# Patient Record
Sex: MALE | Race: OTHER | HISPANIC OR LATINO | URBAN - METROPOLITAN AREA
[De-identification: names, ages, dates, MRNs, and addresses within clinical notes are randomized per-mention and may not be internally consistent; named-entity substitution may affect disease eponyms.]

---

## 2018-03-11 ENCOUNTER — INPATIENT (INPATIENT)
Facility: HOSPITAL | Age: 48
LOS: 2 days | Discharge: ANOTHER HOSPITAL | DRG: 603 | End: 2018-03-14
Attending: SURGERY | Admitting: SURGERY
Payer: COMMERCIAL

## 2018-03-11 VITALS — HEIGHT: 67 IN | WEIGHT: 250 LBS

## 2018-03-11 LAB
ALBUMIN SERPL ELPH-MCNC: 4.7 G/DL — SIGNIFICANT CHANGE UP (ref 3.3–5.2)
ALP SERPL-CCNC: 148 U/L — HIGH (ref 40–120)
ALT FLD-CCNC: 27 U/L — SIGNIFICANT CHANGE UP
ANION GAP SERPL CALC-SCNC: 14 MMOL/L — SIGNIFICANT CHANGE UP (ref 5–17)
APPEARANCE UR: ABNORMAL
APTT BLD: 30.8 SEC — SIGNIFICANT CHANGE UP (ref 27.5–37.4)
AST SERPL-CCNC: 22 U/L — SIGNIFICANT CHANGE UP
BASOPHILS # BLD AUTO: 0 K/UL — SIGNIFICANT CHANGE UP (ref 0–0.2)
BASOPHILS NFR BLD AUTO: 0.3 % — SIGNIFICANT CHANGE UP (ref 0–2)
BILIRUB SERPL-MCNC: 0.5 MG/DL — SIGNIFICANT CHANGE UP (ref 0.4–2)
BILIRUB UR-MCNC: NEGATIVE — SIGNIFICANT CHANGE UP
BUN SERPL-MCNC: 6 MG/DL — LOW (ref 8–20)
CALCIUM SERPL-MCNC: 9.8 MG/DL — SIGNIFICANT CHANGE UP (ref 8.6–10.2)
CHLORIDE SERPL-SCNC: 96 MMOL/L — LOW (ref 98–107)
CO2 SERPL-SCNC: 27 MMOL/L — SIGNIFICANT CHANGE UP (ref 22–29)
COLOR SPEC: YELLOW — SIGNIFICANT CHANGE UP
CREAT SERPL-MCNC: 0.68 MG/DL — SIGNIFICANT CHANGE UP (ref 0.5–1.3)
DIFF PNL FLD: NEGATIVE — SIGNIFICANT CHANGE UP
EOSINOPHIL # BLD AUTO: 0.3 K/UL — SIGNIFICANT CHANGE UP (ref 0–0.5)
EOSINOPHIL NFR BLD AUTO: 2.3 % — SIGNIFICANT CHANGE UP (ref 0–5)
EPI CELLS # UR: SIGNIFICANT CHANGE UP
GLUCOSE SERPL-MCNC: 153 MG/DL — HIGH (ref 70–115)
GLUCOSE UR QL: NEGATIVE MG/DL — SIGNIFICANT CHANGE UP
HCT VFR BLD CALC: 43 % — SIGNIFICANT CHANGE UP (ref 42–52)
HGB BLD-MCNC: 14.8 G/DL — SIGNIFICANT CHANGE UP (ref 14–18)
INR BLD: 1.09 RATIO — SIGNIFICANT CHANGE UP (ref 0.88–1.16)
KETONES UR-MCNC: NEGATIVE — SIGNIFICANT CHANGE UP
LACTATE BLDV-MCNC: 1.3 MMOL/L — SIGNIFICANT CHANGE UP (ref 0.5–2)
LEUKOCYTE ESTERASE UR-ACNC: NEGATIVE — SIGNIFICANT CHANGE UP
LYMPHOCYTES # BLD AUTO: 1.5 K/UL — SIGNIFICANT CHANGE UP (ref 1–4.8)
LYMPHOCYTES # BLD AUTO: 10.7 % — LOW (ref 20–55)
MCHC RBC-ENTMCNC: 27.2 PG — SIGNIFICANT CHANGE UP (ref 27–31)
MCHC RBC-ENTMCNC: 34.4 G/DL — SIGNIFICANT CHANGE UP (ref 32–36)
MCV RBC AUTO: 79 FL — LOW (ref 80–94)
MONOCYTES # BLD AUTO: 1.1 K/UL — HIGH (ref 0–0.8)
MONOCYTES NFR BLD AUTO: 7.9 % — SIGNIFICANT CHANGE UP (ref 3–10)
NEUTROPHILS # BLD AUTO: 10.7 K/UL — HIGH (ref 1.8–8)
NEUTROPHILS NFR BLD AUTO: 77.4 % — HIGH (ref 37–73)
NITRITE UR-MCNC: NEGATIVE — SIGNIFICANT CHANGE UP
PH UR: 7 — SIGNIFICANT CHANGE UP (ref 5–8)
PLATELET # BLD AUTO: 127 K/UL — LOW (ref 150–400)
POTASSIUM SERPL-MCNC: 3.7 MMOL/L — SIGNIFICANT CHANGE UP (ref 3.5–5.3)
POTASSIUM SERPL-SCNC: 3.7 MMOL/L — SIGNIFICANT CHANGE UP (ref 3.5–5.3)
PROT SERPL-MCNC: 7.8 G/DL — SIGNIFICANT CHANGE UP (ref 6.6–8.7)
PROT UR-MCNC: NEGATIVE MG/DL — SIGNIFICANT CHANGE UP
PROTHROM AB SERPL-ACNC: 12 SEC — SIGNIFICANT CHANGE UP (ref 9.8–12.7)
RBC # BLD: 5.44 M/UL — SIGNIFICANT CHANGE UP (ref 4.6–6.2)
RBC # FLD: 14 % — SIGNIFICANT CHANGE UP (ref 11–15.6)
RBC CASTS # UR COMP ASSIST: NEGATIVE /HPF — SIGNIFICANT CHANGE UP (ref 0–4)
SODIUM SERPL-SCNC: 137 MMOL/L — SIGNIFICANT CHANGE UP (ref 135–145)
SP GR SPEC: 1 — LOW (ref 1.01–1.02)
UROBILINOGEN FLD QL: NEGATIVE MG/DL — SIGNIFICANT CHANGE UP
WBC # BLD: 13.8 K/UL — HIGH (ref 4.8–10.8)
WBC # FLD AUTO: 13.8 K/UL — HIGH (ref 4.8–10.8)
WBC UR QL: NEGATIVE — SIGNIFICANT CHANGE UP

## 2018-03-11 PROCEDURE — 71045 X-RAY EXAM CHEST 1 VIEW: CPT | Mod: 26

## 2018-03-11 PROCEDURE — 93010 ELECTROCARDIOGRAM REPORT: CPT

## 2018-03-11 PROCEDURE — 99285 EMERGENCY DEPT VISIT HI MDM: CPT

## 2018-03-11 PROCEDURE — 70491 CT SOFT TISSUE NECK W/DYE: CPT | Mod: 26

## 2018-03-11 RX ORDER — AMPICILLIN SODIUM AND SULBACTAM SODIUM 250; 125 MG/ML; MG/ML
3 INJECTION, POWDER, FOR SUSPENSION INTRAMUSCULAR; INTRAVENOUS ONCE
Qty: 0 | Refills: 0 | Status: COMPLETED | OUTPATIENT
Start: 2018-03-11 | End: 2018-03-11

## 2018-03-11 RX ORDER — SODIUM CHLORIDE 9 MG/ML
3000 INJECTION INTRAMUSCULAR; INTRAVENOUS; SUBCUTANEOUS ONCE
Qty: 0 | Refills: 0 | Status: COMPLETED | OUTPATIENT
Start: 2018-03-11 | End: 2018-03-11

## 2018-03-11 RX ORDER — DEXAMETHASONE 0.5 MG/5ML
10 ELIXIR ORAL ONCE
Qty: 0 | Refills: 0 | Status: COMPLETED | OUTPATIENT
Start: 2018-03-11 | End: 2018-03-11

## 2018-03-11 RX ORDER — ACETAMINOPHEN 500 MG
1000 TABLET ORAL ONCE
Qty: 0 | Refills: 0 | Status: COMPLETED | OUTPATIENT
Start: 2018-03-11 | End: 2018-03-11

## 2018-03-11 RX ADMIN — Medication 400 MILLIGRAM(S): at 19:23

## 2018-03-11 RX ADMIN — AMPICILLIN SODIUM AND SULBACTAM SODIUM 200 GRAM(S): 250; 125 INJECTION, POWDER, FOR SUSPENSION INTRAMUSCULAR; INTRAVENOUS at 19:23

## 2018-03-11 RX ADMIN — Medication 102 MILLIGRAM(S): at 19:24

## 2018-03-11 RX ADMIN — SODIUM CHLORIDE 2000 MILLILITER(S): 9 INJECTION INTRAMUSCULAR; INTRAVENOUS; SUBCUTANEOUS at 19:19

## 2018-03-11 NOTE — ED ADULT TRIAGE NOTE - CHIEF COMPLAINT QUOTE
pt  c/o "tooth ache" and graciela with swallowing, pt reports fevers and is diabetic,pt pale and weak

## 2018-03-11 NOTE — ED ADULT NURSE REASSESSMENT NOTE - NS ED NURSE REASSESS COMMENT FT1
Pt urinated 1000ml clear, yellow urine without difficulty.  No visible blood.  Respirations even and unlabored.  Oxygen saturation remains above 95% on room air.

## 2018-03-11 NOTE — ED PROVIDER NOTE - CHPI ED SYMPTOMS POS
FEVER/+FACIAL SWELLING, TOOTHACHE, DIFFICULTY TOLERATING SECRETIONS, SORE THROAT/DIFFICULTY BREATHING

## 2018-03-11 NOTE — ED PROVIDER NOTE - OBJECTIVE STATEMENT
48 y/o M, with hx of DM and tobacco use, presents to the ED c/o toothache, right sided facial swelling, sore throat, and fever, onset 2 days ago.  Associated sx includes difficulty tolerating secretions and difficulty breathing.  Pt states that he was seen by his PCP yesterday, for a recent kidney stone and hematuria, onset 2 weeks ago.  Pt was not prescribed abx.  Denies CP, HA, N/V/D, abd pain, back pain, cough, visual changes, or chills.  PCP: Dr. Mariana Cardozo.  Code sepsis activated.

## 2018-03-11 NOTE — ED ADULT NURSE NOTE - ED STAT RN HANDOFF DETAILS
pt a+ox4, in no apparent distress or discomfort lying comfortably in bed. pt admitted to SICU, bed available/. pt placed on supplemental 02 via NC, VSS and in no apparent distress. pt transported to SICU stable, bedside report given to Socorro LANDIN. pt accdepted.

## 2018-03-11 NOTE — ED PROVIDER NOTE - PROGRESS NOTE DETAILS
Labs and CT results as noted.  Pt seen and eval by Surgical Critical Care for potential airway concerns and will admit

## 2018-03-11 NOTE — ED STATDOCS - PROGRESS NOTE DETAILS
48 y/o M pt with hx of DM presents to ED c/o mouth pain, difficulty swallowing (Channing's angina) since yesterday. Pt has not been able to eat anything since yesterday. NKDA.   Channing's angina, possible impending airway instruction, will move to critical care area

## 2018-03-11 NOTE — ED ADULT NURSE NOTE - OBJECTIVE STATEMENT
assumed patient care at time of code sepsis.  Pt c/o right sided tooth pain X 2 days.  Swelling to right side of neck.  Difficulty breathing and difficulty swallowing.  Able to swallow secretions at time of assessment.  Saw PMD yesterday and was given only pain medication.  Pt does not know the name. Pt also c/o hematuria X 15 days.

## 2018-03-11 NOTE — ED ADULT NURSE REASSESSMENT NOTE - NS ED NURSE REASSESS COMMENT FT1
Pt sleeping with even, unlabored respirations.  Oxygen saturation 93-95% on room air.  Awaiting transport to CT. Pt sleeping with even, unlabored respirations.  Oxygen saturation 93-95% on room air.

## 2018-03-11 NOTE — ED PROVIDER NOTE - CONSTITUTIONAL, MLM
normal... Appears uncomfortable, well nourished, awake, alert, oriented to person, place, time/situation and in no acute respiratory distress

## 2018-03-11 NOTE — ED PROVIDER NOTE - ENMT, MLM
Moist mucous membranes, airway intact, oropharynx difficult to visual secondary to pain and cannot open his mouth. unable to visualize posterior oropharynx. Right sided facial swelling around mandible, no erythema, not drooling

## 2018-03-12 DIAGNOSIS — R22.1 LOCALIZED SWELLING, MASS AND LUMP, NECK: ICD-10-CM

## 2018-03-12 DIAGNOSIS — M27.2 INFLAMMATORY CONDITIONS OF JAWS: ICD-10-CM

## 2018-03-12 DIAGNOSIS — R22.0 LOCALIZED SWELLING, MASS AND LUMP, HEAD: ICD-10-CM

## 2018-03-12 DIAGNOSIS — E11.9 TYPE 2 DIABETES MELLITUS WITHOUT COMPLICATIONS: ICD-10-CM

## 2018-03-12 DIAGNOSIS — J98.9 RESPIRATORY DISORDER, UNSPECIFIED: ICD-10-CM

## 2018-03-12 LAB
ANION GAP SERPL CALC-SCNC: 15 MMOL/L — SIGNIFICANT CHANGE UP (ref 5–17)
BASOPHILS # BLD AUTO: 0 K/UL — SIGNIFICANT CHANGE UP (ref 0–0.2)
BASOPHILS NFR BLD AUTO: 0.1 % — SIGNIFICANT CHANGE UP (ref 0–2)
BLD GP AB SCN SERPL QL: SIGNIFICANT CHANGE UP
BUN SERPL-MCNC: 12 MG/DL — SIGNIFICANT CHANGE UP (ref 8–20)
CALCIUM SERPL-MCNC: 9.4 MG/DL — SIGNIFICANT CHANGE UP (ref 8.6–10.2)
CHLORIDE SERPL-SCNC: 102 MMOL/L — SIGNIFICANT CHANGE UP (ref 98–107)
CO2 SERPL-SCNC: 23 MMOL/L — SIGNIFICANT CHANGE UP (ref 22–29)
CREAT SERPL-MCNC: 0.57 MG/DL — SIGNIFICANT CHANGE UP (ref 0.5–1.3)
CULTURE RESULTS: NO GROWTH — SIGNIFICANT CHANGE UP
EOSINOPHIL # BLD AUTO: 0 K/UL — SIGNIFICANT CHANGE UP (ref 0–0.5)
EOSINOPHIL NFR BLD AUTO: 0.1 % — SIGNIFICANT CHANGE UP (ref 0–5)
GLUCOSE BLDC GLUCOMTR-MCNC: 158 MG/DL — HIGH (ref 70–99)
GLUCOSE BLDC GLUCOMTR-MCNC: 178 MG/DL — HIGH (ref 70–99)
GLUCOSE BLDC GLUCOMTR-MCNC: 204 MG/DL — HIGH (ref 70–99)
GLUCOSE SERPL-MCNC: 131 MG/DL — HIGH (ref 70–115)
HBA1C BLD-MCNC: 6.5 % — HIGH (ref 4–5.6)
HCT VFR BLD CALC: 39.9 % — LOW (ref 42–52)
HGB BLD-MCNC: 13.6 G/DL — LOW (ref 14–18)
LYMPHOCYTES # BLD AUTO: 1.3 K/UL — SIGNIFICANT CHANGE UP (ref 1–4.8)
LYMPHOCYTES # BLD AUTO: 10.9 % — LOW (ref 20–55)
MCHC RBC-ENTMCNC: 27 PG — SIGNIFICANT CHANGE UP (ref 27–31)
MCHC RBC-ENTMCNC: 34.1 G/DL — SIGNIFICANT CHANGE UP (ref 32–36)
MCV RBC AUTO: 79.2 FL — LOW (ref 80–94)
MONOCYTES # BLD AUTO: 0.9 K/UL — HIGH (ref 0–0.8)
MONOCYTES NFR BLD AUTO: 7.6 % — SIGNIFICANT CHANGE UP (ref 3–10)
NEUTROPHILS # BLD AUTO: 9.9 K/UL — HIGH (ref 1.8–8)
NEUTROPHILS NFR BLD AUTO: 81.1 % — HIGH (ref 37–73)
PLATELET # BLD AUTO: 137 K/UL — LOW (ref 150–400)
POTASSIUM SERPL-MCNC: 3.6 MMOL/L — SIGNIFICANT CHANGE UP (ref 3.5–5.3)
POTASSIUM SERPL-SCNC: 3.6 MMOL/L — SIGNIFICANT CHANGE UP (ref 3.5–5.3)
RBC # BLD: 5.04 M/UL — SIGNIFICANT CHANGE UP (ref 4.6–6.2)
RBC # FLD: 14 % — SIGNIFICANT CHANGE UP (ref 11–15.6)
SODIUM SERPL-SCNC: 140 MMOL/L — SIGNIFICANT CHANGE UP (ref 135–145)
SPECIMEN SOURCE: SIGNIFICANT CHANGE UP
TYPE + AB SCN PNL BLD: SIGNIFICANT CHANGE UP
WBC # BLD: 12.2 K/UL — HIGH (ref 4.8–10.8)
WBC # FLD AUTO: 12.2 K/UL — HIGH (ref 4.8–10.8)

## 2018-03-12 RX ORDER — AMPICILLIN SODIUM AND SULBACTAM SODIUM 250; 125 MG/ML; MG/ML
3 INJECTION, POWDER, FOR SUSPENSION INTRAMUSCULAR; INTRAVENOUS EVERY 6 HOURS
Qty: 0 | Refills: 0 | Status: DISCONTINUED | OUTPATIENT
Start: 2018-03-12 | End: 2018-03-13

## 2018-03-12 RX ORDER — ENOXAPARIN SODIUM 100 MG/ML
40 INJECTION SUBCUTANEOUS DAILY
Qty: 0 | Refills: 0 | Status: DISCONTINUED | OUTPATIENT
Start: 2018-03-12 | End: 2018-03-14

## 2018-03-12 RX ORDER — INSULIN LISPRO 100/ML
VIAL (ML) SUBCUTANEOUS EVERY 6 HOURS
Qty: 0 | Refills: 0 | Status: DISCONTINUED | OUTPATIENT
Start: 2018-03-12 | End: 2018-03-14

## 2018-03-12 RX ORDER — DEXAMETHASONE 0.5 MG/5ML
4 ELIXIR ORAL EVERY 6 HOURS
Qty: 0 | Refills: 0 | Status: DISCONTINUED | OUTPATIENT
Start: 2018-03-12 | End: 2018-03-12

## 2018-03-12 RX ORDER — KETOROLAC TROMETHAMINE 30 MG/ML
15 SYRINGE (ML) INJECTION EVERY 6 HOURS
Qty: 0 | Refills: 0 | Status: DISCONTINUED | OUTPATIENT
Start: 2018-03-12 | End: 2018-03-13

## 2018-03-12 RX ORDER — PANTOPRAZOLE SODIUM 20 MG/1
40 TABLET, DELAYED RELEASE ORAL DAILY
Qty: 0 | Refills: 0 | Status: DISCONTINUED | OUTPATIENT
Start: 2018-03-12 | End: 2018-03-13

## 2018-03-12 RX ORDER — INFLUENZA VIRUS VACCINE 15; 15; 15; 15 UG/.5ML; UG/.5ML; UG/.5ML; UG/.5ML
0.5 SUSPENSION INTRAMUSCULAR ONCE
Qty: 0 | Refills: 0 | Status: DISCONTINUED | OUTPATIENT
Start: 2018-03-12 | End: 2018-03-14

## 2018-03-12 RX ORDER — SODIUM CHLORIDE 9 MG/ML
1000 INJECTION, SOLUTION INTRAVENOUS
Qty: 0 | Refills: 0 | Status: DISCONTINUED | OUTPATIENT
Start: 2018-03-12 | End: 2018-03-13

## 2018-03-12 RX ADMIN — AMPICILLIN SODIUM AND SULBACTAM SODIUM 200 GRAM(S): 250; 125 INJECTION, POWDER, FOR SUSPENSION INTRAMUSCULAR; INTRAVENOUS at 18:00

## 2018-03-12 RX ADMIN — SODIUM CHLORIDE 125 MILLILITER(S): 9 INJECTION, SOLUTION INTRAVENOUS at 11:31

## 2018-03-12 RX ADMIN — AMPICILLIN SODIUM AND SULBACTAM SODIUM 200 GRAM(S): 250; 125 INJECTION, POWDER, FOR SUSPENSION INTRAMUSCULAR; INTRAVENOUS at 07:14

## 2018-03-12 RX ADMIN — Medication 15 MILLIGRAM(S): at 12:30

## 2018-03-12 RX ADMIN — Medication 15 MILLIGRAM(S): at 18:00

## 2018-03-12 RX ADMIN — Medication 4: at 07:23

## 2018-03-12 RX ADMIN — ENOXAPARIN SODIUM 40 MILLIGRAM(S): 100 INJECTION SUBCUTANEOUS at 11:26

## 2018-03-12 RX ADMIN — SODIUM CHLORIDE 125 MILLILITER(S): 9 INJECTION, SOLUTION INTRAVENOUS at 06:14

## 2018-03-12 RX ADMIN — AMPICILLIN SODIUM AND SULBACTAM SODIUM 200 GRAM(S): 250; 125 INJECTION, POWDER, FOR SUSPENSION INTRAMUSCULAR; INTRAVENOUS at 11:25

## 2018-03-12 RX ADMIN — Medication 2: at 11:26

## 2018-03-12 RX ADMIN — SODIUM CHLORIDE 50 MILLILITER(S): 9 INJECTION, SOLUTION INTRAVENOUS at 18:00

## 2018-03-12 RX ADMIN — PANTOPRAZOLE SODIUM 40 MILLIGRAM(S): 20 TABLET, DELAYED RELEASE ORAL at 12:02

## 2018-03-12 RX ADMIN — Medication 15 MILLIGRAM(S): at 11:58

## 2018-03-12 RX ADMIN — Medication 2: at 18:00

## 2018-03-12 NOTE — H&P ADULT - PROBLEM SELECTOR PLAN 1
ENT consult  Abx  Airway precautions  NPO  +/- IV steroids  ISS    ?transfer for definitive OMFS/dental f/u

## 2018-03-12 NOTE — H&P ADULT - NSHPPHYSICALEXAM_GEN_ALL_CORE
Normal appearing adult M lying in bed in NAD  + marked R sided neck/lower facial swelling below jaw line, TTP, indurated, no fluctuance  + trismus, only able to open mouth a few cm. Uvula midline, posterior oropharynx visualized, no visible tonsillar swelling/enlargment. Poor dentition with several missing molars R mandibular surface  S1S2+ RRR no M/R/G  CTA B/L good BS B/L no W/R/R  Abd soft NTND  No legs swelling/edema noted  Skin warm, moist  Awake and alert, no deficits

## 2018-03-12 NOTE — H&P ADULT - ASSESSMENT
A:    47M  HD #1    Here for:    1. Cellulitis, neck/facial  ---> Suspect odontogenic in origin, given locations (suspect mandibular molar as source). However, possible may be tonsillar  2. Oropharyngeal swelling  ---> Concern for impending airway compromise/high risk airway

## 2018-03-12 NOTE — H&P ADULT - ATTENDING COMMENTS
47M with palantine tonsillar edema/inflammation, right mandibular swelling likely from odontoid origin.  Endorses odynophagia and dysphagia, dystonia. No stridor. No difficulty handling secretions currently.     Admit  Abx  Monitor closely - if worsening dysphagia will plan for fiberoptic intubation with preparation for emergent surgical airway if needed  ENT consultation

## 2018-03-12 NOTE — H&P ADULT - NSHPLABSRESULTS_GEN_ALL_CORE
WBC 13.8  Glucose 153  LA 1.3    Labs otherwise unremarkable    CT Neck w/ IV contrast: R sided swelling, no abscess, airway narrowing.

## 2018-03-12 NOTE — PROGRESS NOTE ADULT - SUBJECTIVE AND OBJECTIVE BOX
INTERVAL HPI/OVERNIGHT EVENTS/SUBJECTIVE:  Says he feels better.  Denies difficulty breathing    ICU Vital Signs Last 24 Hrs  T(C): 36.7 (12 Mar 2018 07:50), Max: 39.1 (11 Mar 2018 17:57)  T(F): 98.1 (12 Mar 2018 07:50), Max: 102.4 (11 Mar 2018 17:57)  HR: 81 (12 Mar 2018 11:00) (75 - 110)  BP: 117/77 (12 Mar 2018 11:00) (117/77 - 158/88)  BP(mean): 94 (12 Mar 2018 07:00) (88 - 107)  ABP: --  ABP(mean): --  RR: 20 (12 Mar 2018 11:00) (16 - 28)  SpO2: 97% (12 Mar 2018 11:00) (92% - 99%)      I&O's Detail    11 Mar 2018 07:01  -  12 Mar 2018 07:00  --------------------------------------------------------  IN:    multiple electrolytes Injection Type 1: 625 mL    Sodium Chloride 0.9% IV Bolus: 3000 mL    Solution: 100 mL  Total IN: 3725 mL    OUT:    Voided: 2100 mL  Total OUT: 2100 mL    Total NET: 1625 mL      12 Mar 2018 07:01  -  12 Mar 2018 11:44  --------------------------------------------------------  IN:    multiple electrolytes Injection Type 1: 500 mL    Solution: 100 mL  Total IN: 600 mL    OUT:    Voided: 1000 mL  Total OUT: 1000 mL    Total NET: -400 mL        MEDICATIONS  (STANDING):  ampicillin/sulbactam  IVPB 3 Gram(s) IV Intermittent every 6 hours  enoxaparin Injectable 40 milliGRAM(s) SubCutaneous daily  influenza   Vaccine 0.5 milliLiter(s) IntraMuscular once  insulin lispro (HumaLOG) corrective regimen sliding scale   SubCutaneous every 6 hours  ketorolac   Injectable 15 milliGRAM(s) IV Push every 6 hours  multiple electrolytes Injection Type 1 1000 milliLiter(s) (125 mL/Hr) IV Continuous <Continuous>    MEDICATIONS  (PRN):      NUTRITION/IVF:     CENTRAL LINE:  LOCATION:   DATE INSERTED:  CVP:  SCVO2:    FRAZIER:   DATE INSERTED:    A-LINE:    LOCATION:   DATE INSERTED:   SVV:  CO/CI:     CHEST TUBE:  LOCATION:  DATE INSERTED: OUTPUT/24 HRS:  SUCTION/WATER SEAL:     NG/OG TUBE:  DATE INSERTED:  OUTPUT/24 HRS:    MISC:     PHYSICAL EXAM:    Gen:  NAD    Eyes:  Open spontaneously    Neurological:  GCS 15    ENMT:  right mandibular and submandibular edema, no pain with palaption    Neck:      Pulmonary:  CTA B    Cardiovascular:  S1S2    Gastrointestinal:  S/NT/ND    Genitourinary:  Voids    Back:    Extremities:  No edema    Skin:  Wamr    Musculoskeletal:  Moves all extremities          LABS:  CBC Full  -  ( 11 Mar 2018 19:12 )  WBC Count : 13.8 K/uL  Hemoglobin : 14.8 g/dL  Hematocrit : 43.0 %  Platelet Count - Automated : 127 K/uL  Mean Cell Volume : 79.0 fl  Mean Cell Hemoglobin : 27.2 pg  Mean Cell Hemoglobin Concentration : 34.4 g/dL  Auto Neutrophil # : 10.7 K/uL  Auto Lymphocyte # : 1.5 K/uL  Auto Monocyte # : 1.1 K/uL  Auto Eosinophil # : 0.3 K/uL  Auto Basophil # : 0.0 K/uL  Auto Neutrophil % : 77.4 %  Auto Lymphocyte % : 10.7 %  Auto Monocyte % : 7.9 %  Auto Eosinophil % : 2.3 %  Auto Basophil % : 0.3 %        137  |  96<L>  |  6.0<L>  ----------------------------<  153<H>  3.7   |  27.0  |  0.68    Ca    9.8      11 Mar 2018 19:12    TPro  7.8  /  Alb  4.7  /  TBili  0.5  /  DBili  x   /  AST  22  /  ALT  27  /  AlkPhos  148<H>  11    PT/INR - ( 11 Mar 2018 19:12 )   PT: 12.0 sec;   INR: 1.09 ratio         PTT - ( 11 Mar 2018 19:12 )  PTT:30.8 sec  Urinalysis Basic - ( 11 Mar 2018 20:42 )    Color: Yellow / Appearance: Slightly Turbid / S.005 / pH: x  Gluc: x / Ketone: Negative  / Bili: Negative / Urobili: Negative mg/dL   Blood: x / Protein: Negative mg/dL / Nitrite: Negative   Leuk Esterase: Negative / RBC: Negative /HPF / WBC Negative   Sq Epi: x / Non Sq Epi: Occasional / Bacteria: x      RECENT CULTURES:      LIVER FUNCTIONS - ( 11 Mar 2018 19:12 )  Alb: 4.7 g/dL / Pro: 7.8 g/dL / ALK PHOS: 148 U/L / ALT: 27 U/L / AST: 22 U/L / GGT: x               CAPILLARY BLOOD GLUCOSE      RADIOLOGY & ADDITIONAL STUDIES:    ASSESSMENT/PLAN:  47yMale presenting with:    Neuro:  Pain control w/ toradol.  ENT to evaluate    CV:  Maintain normotension    Pulm:  RA    GI/Nutrition:  NPO until ENT evaluation    /Renal:  Voids    ID:  Unasyn.  ?add Clinda    Lines/Tubes:  PIV    Endo:  ISS.      Skin:  No issues    Proph:  Lovenox    Dispo:  SICU      CRITICAL CARE TIME SPENT:  32 minutes

## 2018-03-12 NOTE — H&P ADULT - HISTORY OF PRESENT ILLNESS
47M PMHx NIDDM, smoker, here for R sided neck/throat/facial pain, odynophagia, fever for 2 days. Began yesterday. No trauma. Went to PMD yesterday for f/u for a recent kidney stone with some hematuria. No similar presentations in past. 47M PMHx NIDDM, smoker, here for R sided neck/throat/facial pain, odynophagia, fever for 2 days. Began yesterday. No trauma. Went to PMD yesterday for f/u for a recent kidney stone with some hematuria. No similar presentations in past.     Temp 102 in ER, "Code Sepsis;" given IVF bolus, never hypotensive, LA normal.     CT with IV contrast done demonstrates R perimandibular cellulitis, R tonsillar swelling, marked narrowing of oropharyngeal airway    SICU consult obtained for concern for airway. ENT not involved at initial presentation.

## 2018-03-13 LAB
ANION GAP SERPL CALC-SCNC: 15 MMOL/L — SIGNIFICANT CHANGE UP (ref 5–17)
BUN SERPL-MCNC: 12 MG/DL — SIGNIFICANT CHANGE UP (ref 8–20)
CALCIUM SERPL-MCNC: 8.9 MG/DL — SIGNIFICANT CHANGE UP (ref 8.6–10.2)
CHLORIDE SERPL-SCNC: 99 MMOL/L — SIGNIFICANT CHANGE UP (ref 98–107)
CO2 SERPL-SCNC: 24 MMOL/L — SIGNIFICANT CHANGE UP (ref 22–29)
CREAT SERPL-MCNC: 0.63 MG/DL — SIGNIFICANT CHANGE UP (ref 0.5–1.3)
GLUCOSE BLDC GLUCOMTR-MCNC: 151 MG/DL — HIGH (ref 70–99)
GLUCOSE BLDC GLUCOMTR-MCNC: 175 MG/DL — HIGH (ref 70–99)
GLUCOSE BLDC GLUCOMTR-MCNC: 210 MG/DL — HIGH (ref 70–99)
GLUCOSE BLDC GLUCOMTR-MCNC: 254 MG/DL — HIGH (ref 70–99)
GLUCOSE BLDC GLUCOMTR-MCNC: 282 MG/DL — HIGH (ref 70–99)
GLUCOSE SERPL-MCNC: 129 MG/DL — HIGH (ref 70–115)
HCT VFR BLD CALC: 38.8 % — LOW (ref 42–52)
HGB BLD-MCNC: 13.3 G/DL — LOW (ref 14–18)
MCHC RBC-ENTMCNC: 26.8 PG — LOW (ref 27–31)
MCHC RBC-ENTMCNC: 34.3 G/DL — SIGNIFICANT CHANGE UP (ref 32–36)
MCV RBC AUTO: 78.1 FL — LOW (ref 80–94)
PLATELET # BLD AUTO: 137 K/UL — LOW (ref 150–400)
POTASSIUM SERPL-MCNC: 3.4 MMOL/L — LOW (ref 3.5–5.3)
POTASSIUM SERPL-SCNC: 3.4 MMOL/L — LOW (ref 3.5–5.3)
RBC # BLD: 4.97 M/UL — SIGNIFICANT CHANGE UP (ref 4.6–6.2)
RBC # FLD: 14.1 % — SIGNIFICANT CHANGE UP (ref 11–15.6)
SODIUM SERPL-SCNC: 138 MMOL/L — SIGNIFICANT CHANGE UP (ref 135–145)
WBC # BLD: 8 K/UL — SIGNIFICANT CHANGE UP (ref 4.8–10.8)
WBC # FLD AUTO: 8 K/UL — SIGNIFICANT CHANGE UP (ref 4.8–10.8)

## 2018-03-13 PROCEDURE — 99232 SBSQ HOSP IP/OBS MODERATE 35: CPT

## 2018-03-13 RX ORDER — SACCHAROMYCES BOULARDII 250 MG
250 POWDER IN PACKET (EA) ORAL
Qty: 0 | Refills: 0 | Status: DISCONTINUED | OUTPATIENT
Start: 2018-03-13 | End: 2018-03-14

## 2018-03-13 RX ORDER — METFORMIN HYDROCHLORIDE 850 MG/1
500 TABLET ORAL
Qty: 0 | Refills: 0 | Status: DISCONTINUED | OUTPATIENT
Start: 2018-03-13 | End: 2018-03-14

## 2018-03-13 RX ORDER — NICOTINE POLACRILEX 2 MG
1 GUM BUCCAL DAILY
Qty: 0 | Refills: 0 | Status: DISCONTINUED | OUTPATIENT
Start: 2018-03-13 | End: 2018-03-14

## 2018-03-13 RX ORDER — ACETAMINOPHEN 500 MG
1000 TABLET ORAL ONCE
Qty: 0 | Refills: 0 | Status: COMPLETED | OUTPATIENT
Start: 2018-03-13 | End: 2018-03-13

## 2018-03-13 RX ORDER — PANTOPRAZOLE SODIUM 20 MG/1
40 TABLET, DELAYED RELEASE ORAL
Qty: 0 | Refills: 0 | Status: DISCONTINUED | OUTPATIENT
Start: 2018-03-13 | End: 2018-03-14

## 2018-03-13 RX ORDER — ACETAMINOPHEN 500 MG
650 TABLET ORAL EVERY 6 HOURS
Qty: 0 | Refills: 0 | Status: DISCONTINUED | OUTPATIENT
Start: 2018-03-13 | End: 2018-03-14

## 2018-03-13 RX ADMIN — Medication 1 PATCH: at 23:08

## 2018-03-13 RX ADMIN — Medication 650 MILLIGRAM(S): at 23:06

## 2018-03-13 RX ADMIN — SODIUM CHLORIDE 50 MILLILITER(S): 9 INJECTION, SOLUTION INTRAVENOUS at 05:19

## 2018-03-13 RX ADMIN — Medication 15 MILLIGRAM(S): at 00:18

## 2018-03-13 RX ADMIN — AMPICILLIN SODIUM AND SULBACTAM SODIUM 200 GRAM(S): 250; 125 INJECTION, POWDER, FOR SUSPENSION INTRAMUSCULAR; INTRAVENOUS at 00:04

## 2018-03-13 RX ADMIN — Medication 1 TABLET(S): at 17:07

## 2018-03-13 RX ADMIN — Medication 6: at 11:31

## 2018-03-13 RX ADMIN — Medication 400 MILLIGRAM(S): at 15:08

## 2018-03-13 RX ADMIN — Medication 15 MILLIGRAM(S): at 05:21

## 2018-03-13 RX ADMIN — Medication 15 MILLIGRAM(S): at 06:30

## 2018-03-13 RX ADMIN — Medication 2: at 17:07

## 2018-03-13 RX ADMIN — METFORMIN HYDROCHLORIDE 500 MILLIGRAM(S): 850 TABLET ORAL at 17:07

## 2018-03-13 RX ADMIN — ENOXAPARIN SODIUM 40 MILLIGRAM(S): 100 INJECTION SUBCUTANEOUS at 11:31

## 2018-03-13 RX ADMIN — AMPICILLIN SODIUM AND SULBACTAM SODIUM 200 GRAM(S): 250; 125 INJECTION, POWDER, FOR SUSPENSION INTRAMUSCULAR; INTRAVENOUS at 05:19

## 2018-03-13 NOTE — PROGRESS NOTE ADULT - SUBJECTIVE AND OBJECTIVE BOX
INTERVAL HPI/OVERNIGHT EVENTS/SUBJECTIVE:    ICU Vital Signs Last 24 Hrs  T(C): 36.6 (13 Mar 2018 08:00), Max: 37.1 (12 Mar 2018 20:00)  T(F): 97.9 (13 Mar 2018 08:00), Max: 98.7 (12 Mar 2018 20:00)  HR: 60 (13 Mar 2018 08:00) (54 - 85)  BP: 105/60 (13 Mar 2018 07:00) (94/54 - 128/79)  BP(mean): 78 (13 Mar 2018 07:00) (68 - 93)  ABP: --  ABP(mean): --  RR: 13 (13 Mar 2018 08:00) (12 - 27)  SpO2: 97% (13 Mar 2018 08:00) (94% - 98%)      I&O's Detail    12 Mar 2018 07:01  -  13 Mar 2018 07:00  --------------------------------------------------------  IN:    multiple electrolytes Injection Type 1: 2025 mL    Solution: 400 mL  Total IN: 2425 mL    OUT:    Voided: 2825 mL  Total OUT: 2825 mL    Total NET: -400 mL                MEDICATIONS  (STANDING):  ampicillin/sulbactam  IVPB 3 Gram(s) IV Intermittent every 6 hours  enoxaparin Injectable 40 milliGRAM(s) SubCutaneous daily  influenza   Vaccine 0.5 milliLiter(s) IntraMuscular once  insulin lispro (HumaLOG) corrective regimen sliding scale   SubCutaneous every 6 hours  multiple electrolytes Injection Type 1 1000 milliLiter(s) (50 mL/Hr) IV Continuous <Continuous>  pantoprazole  Injectable 40 milliGRAM(s) IV Push daily    MEDICATIONS  (PRN):      NUTRITION/IVF:     CENTRAL LINE:  LOCATION:   DATE INSERTED:  CVP:  SCVO2:    FRAZIER:   DATE INSERTED:    A-LINE:    LOCATION:   DATE INSERTED:   SVV:  CO/CI:     CHEST TUBE:  LOCATION:  DATE INSERTED: OUTPUT/24 HRS:  SUCTION/WATER SEAL:     NG/OG TUBE:  DATE INSERTED:  OUTPUT/24 HRS:    MISC:     PHYSICAL EXAM:    Gen:    Eyes:    Neurological:    ENMT:    Neck:    Pulmonary:    Cardiovascular:    Gastrointestinal:    Genitourinary:    Back:    Extremities:    Skin:    Musculoskeletal:          LABS:  CBC Full  -  ( 13 Mar 2018 06:51 )  WBC Count : 8.0 K/uL  Hemoglobin : 13.3 g/dL  Hematocrit : 38.8 %  Platelet Count - Automated : 137 K/uL  Mean Cell Volume : 78.1 fl  Mean Cell Hemoglobin : 26.8 pg  Mean Cell Hemoglobin Concentration : 34.3 g/dL  Auto Neutrophil # : x  Auto Lymphocyte # : x  Auto Monocyte # : x  Auto Eosinophil # : x  Auto Basophil # : x  Auto Neutrophil % : x  Auto Lymphocyte % : x  Auto Monocyte % : x  Auto Eosinophil % : x  Auto Basophil % : x        138  |  99  |  12.0  ----------------------------<  129<H>  3.4<L>   |  24.0  |  0.63    Ca    8.9      13 Mar 2018 06:51    TPro  7.8  /  Alb  4.7  /  TBili  0.5  /  DBili  x   /  AST  22  /  ALT  27  /  AlkPhos  148<H>      PT/INR - ( 11 Mar 2018 19:12 )   PT: 12.0 sec;   INR: 1.09 ratio         PTT - ( 11 Mar 2018 19:12 )  PTT:30.8 sec  Urinalysis Basic - ( 11 Mar 2018 20:42 )    Color: Yellow / Appearance: Slightly Turbid / S.005 / pH: x  Gluc: x / Ketone: Negative  / Bili: Negative / Urobili: Negative mg/dL   Blood: x / Protein: Negative mg/dL / Nitrite: Negative   Leuk Esterase: Negative / RBC: Negative /HPF / WBC Negative   Sq Epi: x / Non Sq Epi: Occasional / Bacteria: x      RECENT CULTURES:   .Urine Clean Catch (Midstream) XXXX XXXX   No growth        LIVER FUNCTIONS - ( 11 Mar 2018 19:12 )  Alb: 4.7 g/dL / Pro: 7.8 g/dL / ALK PHOS: 148 U/L / ALT: 27 U/L / AST: 22 U/L / GGT: x               CAPILLARY BLOOD GLUCOSE      RADIOLOGY & ADDITIONAL STUDIES:    ASSESSMENT/PLAN:  47yMale presenting with:    Neuro:    CV:    Pulm:    GI/Nutrition:    /Renal:    ID:    Lines/Tubes:    Endo:    Skin:    Proph:    Dispo:      CRITICAL CARE TIME SPENT: INTERVAL HPI/OVERNIGHT EVENTS/SUBJECTIVE:  No issues o/n.  Says pain better controlled.  Denies SOB/LAVINIA.      ICU Vital Signs Last 24 Hrs  T(C): 36.6 (13 Mar 2018 08:00), Max: 37.1 (12 Mar 2018 20:00)  T(F): 97.9 (13 Mar 2018 08:00), Max: 98.7 (12 Mar 2018 20:00)  HR: 60 (13 Mar 2018 08:00) (54 - 85)  BP: 105/60 (13 Mar 2018 07:00) (94/54 - 128/79)  BP(mean): 78 (13 Mar 2018 07:00) (68 - 93)  ABP: --  ABP(mean): --  RR: 13 (13 Mar 2018 08:00) (12 - 27)  SpO2: 97% (13 Mar 2018 08:00) (94% - 98%)      I&O's Detail    12 Mar 2018 07:01  -  13 Mar 2018 07:00  --------------------------------------------------------  IN:    multiple electrolytes Injection Type 1: 2025 mL    Solution: 400 mL  Total IN: 2425 mL    OUT:    Voided: 2825 mL  Total OUT: 2825 mL    Total NET: -400 mL      MEDICATIONS  (STANDING):  ampicillin/sulbactam  IVPB 3 Gram(s) IV Intermittent every 6 hours  enoxaparin Injectable 40 milliGRAM(s) SubCutaneous daily  influenza   Vaccine 0.5 milliLiter(s) IntraMuscular once  insulin lispro (HumaLOG) corrective regimen sliding scale   SubCutaneous every 6 hours  multiple electrolytes Injection Type 1 1000 milliLiter(s) (50 mL/Hr) IV Continuous <Continuous>  pantoprazole  Injectable 40 milliGRAM(s) IV Push daily    MEDICATIONS  (PRN):      NUTRITION/IVF:     CENTRAL LINE:  LOCATION:   DATE INSERTED:  CVP:  SCVO2:    FRAZIER:   DATE INSERTED:    A-LINE:    LOCATION:   DATE INSERTED:   SVV:  CO/CI:     CHEST TUBE:  LOCATION:  DATE INSERTED: OUTPUT/24 HRS:  SUCTION/WATER SEAL:     NG/OG TUBE:  DATE INSERTED:  OUTPUT/24 HRS:    MISC:     PHYSICAL EXAM:    Gen:  NAD    Eyes:  open spontaneously    Neurological:    ENMT:  Some pain on palpation of right mandible, submandibular area.  No erythema.  No edema    Neck:  No edema    Pulmonary:  CTA B    Cardiovascular:  S1S2 Regular    Gastrointestinal:  S/NT/ND    Genitourinary:  Voids    Back:    Extremities:  No edema    Skin:  warm    Musculoskeletal:  Moves all extremities          LABS:  CBC Full  -  ( 13 Mar 2018 06:51 )  WBC Count : 8.0 K/uL  Hemoglobin : 13.3 g/dL  Hematocrit : 38.8 %  Platelet Count - Automated : 137 K/uL  Mean Cell Volume : 78.1 fl  Mean Cell Hemoglobin : 26.8 pg  Mean Cell Hemoglobin Concentration : 34.3 g/dL  Auto Neutrophil # : x  Auto Lymphocyte # : x  Auto Monocyte # : x  Auto Eosinophil # : x  Auto Basophil # : x  Auto Neutrophil % : x  Auto Lymphocyte % : x  Auto Monocyte % : x  Auto Eosinophil % : x  Auto Basophil % : x        138  |  99  |  12.0  ----------------------------<  129<H>  3.4<L>   |  24.0  |  0.63    Ca    8.9      13 Mar 2018 06:51    TPro  7.8  /  Alb  4.7  /  TBili  0.5  /  DBili  x   /  AST  22  /  ALT  27  /  AlkPhos  148<H>      PT/INR - ( 11 Mar 2018 19:12 )   PT: 12.0 sec;   INR: 1.09 ratio         PTT - ( 11 Mar 2018 19:12 )  PTT:30.8 sec  Urinalysis Basic - ( 11 Mar 2018 20:42 )    Color: Yellow / Appearance: Slightly Turbid / S.005 / pH: x  Gluc: x / Ketone: Negative  / Bili: Negative / Urobili: Negative mg/dL   Blood: x / Protein: Negative mg/dL / Nitrite: Negative   Leuk Esterase: Negative / RBC: Negative /HPF / WBC Negative   Sq Epi: x / Non Sq Epi: Occasional / Bacteria: x      RECENT CULTURES:   .Urine Clean Catch (Midstream) XXXX XXXX   No growth        LIVER FUNCTIONS - ( 11 Mar 2018 19:12 )  Alb: 4.7 g/dL / Pro: 7.8 g/dL / ALK PHOS: 148 U/L / ALT: 27 U/L / AST: 22 U/L / GGT: x               CAPILLARY BLOOD GLUCOSE      RADIOLOGY & ADDITIONAL STUDIES:    ASSESSMENT/PLAN:  47yMale presenting with:    Neuro:  Pain control prn    CV:  Maintain normotension    Pulm:  RA    GI/Nutrition:  Advance to regular diet    /Renal:  No issues    ID:  change Unasyn to Augmentin,.  F/u ENT    Lines/Tubes:  PIV    Endo:  Humalog    Skin:  No issues    Proph:  SCDs, Lovenox    Dispo:  May transfer to floor      CRITICAL CARE TIME SPENT:

## 2018-03-14 ENCOUNTER — TRANSCRIPTION ENCOUNTER (OUTPATIENT)
Age: 48
End: 2018-03-14

## 2018-03-14 VITALS
RESPIRATION RATE: 18 BRPM | HEART RATE: 69 BPM | SYSTOLIC BLOOD PRESSURE: 121 MMHG | DIASTOLIC BLOOD PRESSURE: 62 MMHG | TEMPERATURE: 99 F | OXYGEN SATURATION: 99 %

## 2018-03-14 DIAGNOSIS — M27.2 INFLAMMATORY CONDITIONS OF JAWS: ICD-10-CM

## 2018-03-14 LAB
ANION GAP SERPL CALC-SCNC: 13 MMOL/L — SIGNIFICANT CHANGE UP (ref 5–17)
BASOPHILS # BLD AUTO: 0 K/UL — SIGNIFICANT CHANGE UP (ref 0–0.2)
BASOPHILS NFR BLD AUTO: 0.6 % — SIGNIFICANT CHANGE UP (ref 0–2)
BUN SERPL-MCNC: 15 MG/DL — SIGNIFICANT CHANGE UP (ref 8–20)
CALCIUM SERPL-MCNC: 9 MG/DL — SIGNIFICANT CHANGE UP (ref 8.6–10.2)
CHLORIDE SERPL-SCNC: 106 MMOL/L — SIGNIFICANT CHANGE UP (ref 98–107)
CO2 SERPL-SCNC: 22 MMOL/L — SIGNIFICANT CHANGE UP (ref 22–29)
CREAT SERPL-MCNC: 0.62 MG/DL — SIGNIFICANT CHANGE UP (ref 0.5–1.3)
EOSINOPHIL # BLD AUTO: 0.2 K/UL — SIGNIFICANT CHANGE UP (ref 0–0.5)
EOSINOPHIL NFR BLD AUTO: 3.7 % — SIGNIFICANT CHANGE UP (ref 0–5)
GLUCOSE BLDC GLUCOMTR-MCNC: 155 MG/DL — HIGH (ref 70–99)
GLUCOSE BLDC GLUCOMTR-MCNC: 166 MG/DL — HIGH (ref 70–99)
GLUCOSE SERPL-MCNC: 136 MG/DL — HIGH (ref 70–115)
HCT VFR BLD CALC: 40.5 % — LOW (ref 42–52)
HGB BLD-MCNC: 13.9 G/DL — LOW (ref 14–18)
LYMPHOCYTES # BLD AUTO: 2.1 K/UL — SIGNIFICANT CHANGE UP (ref 1–4.8)
LYMPHOCYTES # BLD AUTO: 31.5 % — SIGNIFICANT CHANGE UP (ref 20–55)
MAGNESIUM SERPL-MCNC: 2 MG/DL — SIGNIFICANT CHANGE UP (ref 1.6–2.6)
MCHC RBC-ENTMCNC: 26.9 PG — LOW (ref 27–31)
MCHC RBC-ENTMCNC: 34.3 G/DL — SIGNIFICANT CHANGE UP (ref 32–36)
MCV RBC AUTO: 78.3 FL — LOW (ref 80–94)
MONOCYTES # BLD AUTO: 0.6 K/UL — SIGNIFICANT CHANGE UP (ref 0–0.8)
MONOCYTES NFR BLD AUTO: 8.4 % — SIGNIFICANT CHANGE UP (ref 3–10)
NEUTROPHILS # BLD AUTO: 3.6 K/UL — SIGNIFICANT CHANGE UP (ref 1.8–8)
NEUTROPHILS NFR BLD AUTO: 55.5 % — SIGNIFICANT CHANGE UP (ref 37–73)
PLATELET # BLD AUTO: 141 K/UL — LOW (ref 150–400)
POTASSIUM SERPL-MCNC: 3.8 MMOL/L — SIGNIFICANT CHANGE UP (ref 3.5–5.3)
POTASSIUM SERPL-SCNC: 3.8 MMOL/L — SIGNIFICANT CHANGE UP (ref 3.5–5.3)
RBC # BLD: 5.17 M/UL — SIGNIFICANT CHANGE UP (ref 4.6–6.2)
RBC # FLD: 14.1 % — SIGNIFICANT CHANGE UP (ref 11–15.6)
SODIUM SERPL-SCNC: 141 MMOL/L — SIGNIFICANT CHANGE UP (ref 135–145)
WBC # BLD: 6.6 K/UL — SIGNIFICANT CHANGE UP (ref 4.8–10.8)
WBC # FLD AUTO: 6.6 K/UL — SIGNIFICANT CHANGE UP (ref 4.8–10.8)

## 2018-03-14 PROCEDURE — 81001 URINALYSIS AUTO W/SCOPE: CPT

## 2018-03-14 PROCEDURE — 36415 COLL VENOUS BLD VENIPUNCTURE: CPT

## 2018-03-14 PROCEDURE — 80048 BASIC METABOLIC PNL TOTAL CA: CPT

## 2018-03-14 PROCEDURE — 83735 ASSAY OF MAGNESIUM: CPT

## 2018-03-14 PROCEDURE — 90686 IIV4 VACC NO PRSV 0.5 ML IM: CPT

## 2018-03-14 PROCEDURE — 99285 EMERGENCY DEPT VISIT HI MDM: CPT | Mod: 25

## 2018-03-14 PROCEDURE — 86901 BLOOD TYPING SEROLOGIC RH(D): CPT

## 2018-03-14 PROCEDURE — 70491 CT SOFT TISSUE NECK W/DYE: CPT

## 2018-03-14 PROCEDURE — 87040 BLOOD CULTURE FOR BACTERIA: CPT

## 2018-03-14 PROCEDURE — T1013: CPT

## 2018-03-14 PROCEDURE — 96375 TX/PRO/DX INJ NEW DRUG ADDON: CPT | Mod: XU

## 2018-03-14 PROCEDURE — 86900 BLOOD TYPING SEROLOGIC ABO: CPT

## 2018-03-14 PROCEDURE — 96374 THER/PROPH/DIAG INJ IV PUSH: CPT | Mod: XU

## 2018-03-14 PROCEDURE — 87086 URINE CULTURE/COLONY COUNT: CPT

## 2018-03-14 PROCEDURE — 71045 X-RAY EXAM CHEST 1 VIEW: CPT

## 2018-03-14 PROCEDURE — 85027 COMPLETE CBC AUTOMATED: CPT

## 2018-03-14 PROCEDURE — 83036 HEMOGLOBIN GLYCOSYLATED A1C: CPT

## 2018-03-14 PROCEDURE — 82962 GLUCOSE BLOOD TEST: CPT

## 2018-03-14 PROCEDURE — 86850 RBC ANTIBODY SCREEN: CPT

## 2018-03-14 PROCEDURE — 85610 PROTHROMBIN TIME: CPT

## 2018-03-14 PROCEDURE — 83605 ASSAY OF LACTIC ACID: CPT

## 2018-03-14 PROCEDURE — 85730 THROMBOPLASTIN TIME PARTIAL: CPT

## 2018-03-14 PROCEDURE — 80053 COMPREHEN METABOLIC PANEL: CPT

## 2018-03-14 PROCEDURE — 93005 ELECTROCARDIOGRAM TRACING: CPT

## 2018-03-14 RX ORDER — PANTOPRAZOLE SODIUM 20 MG/1
1 TABLET, DELAYED RELEASE ORAL
Qty: 0 | Refills: 0 | COMMUNITY
Start: 2018-03-14

## 2018-03-14 RX ORDER — NICOTINE POLACRILEX 2 MG
0 GUM BUCCAL
Qty: 0 | Refills: 0 | COMMUNITY
Start: 2018-03-14

## 2018-03-14 RX ORDER — AMPICILLIN SODIUM AND SULBACTAM SODIUM 250; 125 MG/ML; MG/ML
0 INJECTION, POWDER, FOR SUSPENSION INTRAMUSCULAR; INTRAVENOUS
Qty: 0 | Refills: 0 | COMMUNITY
Start: 2018-03-14

## 2018-03-14 RX ORDER — AMPICILLIN SODIUM AND SULBACTAM SODIUM 250; 125 MG/ML; MG/ML
3 INJECTION, POWDER, FOR SUSPENSION INTRAMUSCULAR; INTRAVENOUS ONCE
Qty: 0 | Refills: 0 | Status: COMPLETED | OUTPATIENT
Start: 2018-03-14 | End: 2018-03-14

## 2018-03-14 RX ORDER — METFORMIN HYDROCHLORIDE 850 MG/1
1 TABLET ORAL
Qty: 0 | Refills: 0 | COMMUNITY
Start: 2018-03-14

## 2018-03-14 RX ORDER — SACCHAROMYCES BOULARDII 250 MG
1 POWDER IN PACKET (EA) ORAL
Qty: 0 | Refills: 0 | COMMUNITY
Start: 2018-03-14

## 2018-03-14 RX ORDER — ACETAMINOPHEN 500 MG
2 TABLET ORAL
Qty: 0 | Refills: 0 | COMMUNITY
Start: 2018-03-14

## 2018-03-14 RX ORDER — AMPICILLIN SODIUM AND SULBACTAM SODIUM 250; 125 MG/ML; MG/ML
3 INJECTION, POWDER, FOR SUSPENSION INTRAMUSCULAR; INTRAVENOUS EVERY 6 HOURS
Qty: 0 | Refills: 0 | Status: DISCONTINUED | OUTPATIENT
Start: 2018-03-14 | End: 2018-03-14

## 2018-03-14 RX ORDER — AMPICILLIN SODIUM AND SULBACTAM SODIUM 250; 125 MG/ML; MG/ML
INJECTION, POWDER, FOR SUSPENSION INTRAMUSCULAR; INTRAVENOUS
Qty: 0 | Refills: 0 | Status: DISCONTINUED | OUTPATIENT
Start: 2018-03-14 | End: 2018-03-14

## 2018-03-14 RX ORDER — ENOXAPARIN SODIUM 100 MG/ML
0 INJECTION SUBCUTANEOUS
Qty: 0 | Refills: 0 | COMMUNITY
Start: 2018-03-14

## 2018-03-14 RX ORDER — INFLUENZA VIRUS VACCINE 15; 15; 15; 15 UG/.5ML; UG/.5ML; UG/.5ML; UG/.5ML
0.5 SUSPENSION INTRAMUSCULAR ONCE
Qty: 0 | Refills: 0 | Status: COMPLETED | OUTPATIENT
Start: 2018-03-14 | End: 2018-03-14

## 2018-03-14 RX ADMIN — Medication 1 PATCH: at 15:31

## 2018-03-14 RX ADMIN — Medication 1 TABLET(S): at 05:49

## 2018-03-14 RX ADMIN — Medication 250 MILLIGRAM(S): at 05:49

## 2018-03-14 RX ADMIN — Medication 2: at 11:48

## 2018-03-14 RX ADMIN — Medication 650 MILLIGRAM(S): at 00:05

## 2018-03-14 RX ADMIN — Medication 2: at 07:56

## 2018-03-14 RX ADMIN — PANTOPRAZOLE SODIUM 40 MILLIGRAM(S): 20 TABLET, DELAYED RELEASE ORAL at 05:50

## 2018-03-14 RX ADMIN — AMPICILLIN SODIUM AND SULBACTAM SODIUM 200 GRAM(S): 250; 125 INJECTION, POWDER, FOR SUSPENSION INTRAMUSCULAR; INTRAVENOUS at 14:43

## 2018-03-14 RX ADMIN — ENOXAPARIN SODIUM 40 MILLIGRAM(S): 100 INJECTION SUBCUTANEOUS at 11:48

## 2018-03-14 RX ADMIN — Medication 650 MILLIGRAM(S): at 06:00

## 2018-03-14 RX ADMIN — Medication 650 MILLIGRAM(S): at 05:52

## 2018-03-14 RX ADMIN — METFORMIN HYDROCHLORIDE 500 MILLIGRAM(S): 850 TABLET ORAL at 07:56

## 2018-03-14 RX ADMIN — INFLUENZA VIRUS VACCINE 0.5 MILLILITER(S): 15; 15; 15; 15 SUSPENSION INTRAMUSCULAR at 15:56

## 2018-03-14 NOTE — DISCHARGE NOTE ADULT - PATIENT PORTAL LINK FT
You can access the [facility] Patient Portal, offered by [facility], by registering with the following website: [email or web address]

## 2018-03-14 NOTE — DISCHARGE NOTE ADULT - CARE PLAN
Principal Discharge DX:	Odontogenic infection of jaw  Goal:	Please follow recommendations from [facility] when transferred.  Assessment and plan of treatment:	Please follow recommendations from [facility] when transferred.  Secondary Diagnosis:	Type 2 diabetes mellitus without complication, unspecified long term insulin use status  Secondary Diagnosis:	Jaw swelling

## 2018-03-14 NOTE — DISCHARGE NOTE ADULT - HOSPITAL COURSE
47M PMHx NIDDM, smoker, here for R sided neck/throat/facial pain, odynophagia, fever for 2 days. Began yesterday. No trauma. Went to PMD yesterday for f/u for a recent kidney stone with some hematuria. No similar presentations in past.   Temp 102 in ER, "Code Sepsis;" given IVF bolus, never hypotensive, LA normal.   CT with IV contrast done demonstrates R perimandibular cellulitis, R tonsillar swelling, marked narrowing of oropharyngeal airway  SICU consult obtained for concern for airway. ENT not involved at initial presentation.   Did well in SICU and was downgraded to floors .  However SX slightly increased despite Abx.  Transfer to [facility] arranged to Dr Mosquera ENT and Dr Mccoy dentist and they accept transfer.

## 2018-03-14 NOTE — PROGRESS NOTE ADULT - PROBLEM SELECTOR PLAN 1
switched to PO abx yesterday but increased pain today, no increased WBC or fevers.  await call back from ENT for plan.  Probably needs tooth extraction and may need transfer to facilitate

## 2018-03-14 NOTE — DISCHARGE NOTE ADULT - PLAN OF CARE
Please follow recommendations from [facility] when transferred. Please follow recommendations from [facility] when transferred.

## 2018-03-14 NOTE — DISCHARGE NOTE ADULT - MEDICATION SUMMARY - MEDICATIONS TO TAKE
I will START or STAY ON the medications listed below when I get home from the hospital:    acetaminophen 325 mg oral tablet  -- 2 tab(s) by mouth every 6 hours, As needed, Mild Pain (1 - 3)  -- Indication: For Pain     mg oral tablet  -- 1 tab(s) by mouth every 6 hours  -- Do not take this drug if you are pregnant.  It is very important that you take or use this exactly as directed.  Do not skip doses or discontinue unless directed by your doctor.  May cause drowsiness or dizziness.  Obtain medical advice before taking any non-prescription drugs as some may affect the action of this medication.  Take with food or milk.    -- Indication: For Pain    enoxaparin  -- Indication: For While in patient    metFORMIN 500 mg oral tablet  -- 1 tab(s) by mouth 2 times a day (with meals)  -- Indication: For Diabetes    Zofran 4 mg oral tablet  -- 1 tab(s) by mouth every 6 hours  -- Indication: For vomiting    amoxicillin-clavulanate 875 mg-125 mg oral tablet  -- 1 tab(s) by mouth every 12 hours  -- Indication: For Odontogenic infection of jaw    saccharomyces boulardii lyo 250 mg oral capsule  -- 1 cap(s) by mouth 2 times a day  -- Indication: For Odontogenic infection of jaw    pantoprazole 40 mg oral delayed release tablet  -- 1 tab(s) by mouth once a day (before a meal)  -- Indication: For Inflammatory disorder of jaw    nicotine 21 mg/24 hr transdermal film, extended release  --  by transdermal patch   -- Indication: For Smoking I will START or STAY ON the medications listed below when I get home from the hospital:    acetaminophen 325 mg oral tablet  -- 2 tab(s) by mouth every 6 hours, As needed, Mild Pain (1 - 3)  -- Indication: For Pain    acetaminophen 325 mg oral tablet  -- 2 tab(s) by mouth every 6 hours, As needed, Mild Pain (1 - 3)  -- Indication: For pain     mg oral tablet  -- 1 tab(s) by mouth every 6 hours  -- Do not take this drug if you are pregnant.  It is very important that you take or use this exactly as directed.  Do not skip doses or discontinue unless directed by your doctor.  May cause drowsiness or dizziness.  Obtain medical advice before taking any non-prescription drugs as some may affect the action of this medication.  Take with food or milk.    -- Indication: For Pain    enoxaparin  -- Indication: For Duplicate    enoxaparin  -- Indication: For While in patient    metFORMIN 500 mg oral tablet  -- 1 tab(s) by mouth 2 times a day (with meals)  -- Indication: For Diabetes    metFORMIN 500 mg oral tablet  -- 1 tab(s) by mouth 2 times a day (with meals)  -- Indication: For Duplicate    Zofran 4 mg oral tablet  -- 1 tab(s) by mouth every 6 hours  -- Indication: For vomiting    ampicillin-sulbactam  -- Indication: For Odontogenic infection of jaw    amoxicillin-clavulanate 875 mg-125 mg oral tablet  -- 1 tab(s) by mouth every 12 hours  -- Indication: For Not being given    saccharomyces boulardii lyo 250 mg oral capsule  -- 1 cap(s) by mouth 2 times a day  -- Indication: For Odontogenic infection of jaw    saccharomyces boulardii lyo 250 mg oral capsule  -- 1 cap(s) by mouth 2 times a day  -- Indication: For Duplicate    pantoprazole 40 mg oral delayed release tablet  -- 1 tab(s) by mouth once a day (before a meal)  -- Indication: For Duplicate    pantoprazole 40 mg oral delayed release tablet  -- 1 tab(s) by mouth once a day (before a meal)  -- Indication: For Inflammatory disorder of jaw    nicotine 21 mg/24 hr transdermal film, extended release  --  by transdermal patch   -- Indication: For Smoking    nicotine 21 mg/24 hr transdermal film, extended release  --  by transdermal patch   -- Indication: For Duplicate

## 2018-03-14 NOTE — PROGRESS NOTE ADULT - SUBJECTIVE AND OBJECTIVE BOX
INTERVAL HPI/OVERNIGHT EVENTS: c/o increased pain to mandibular area overnight / transferred from unit overnight    STATUS POST:      POST OPERATIVE DAY #:     SUBJECTIVE:  Flatus: [x ] YES [ ] NO             Bowel Movement: [x ] YES [ ] NO  Pain (0-10):            Pain Control Adequate: [ ] YES [x ] NO  Nausea: [ ] YES [x NO            Vomiting: [ ] YES [x ] NO  Diarrhea: [ ] YES [x ] NO         Constipation: [ ] YES [x ] NO     Chest Pain: [ ] YES [x ] NO    SOB:  [ ] YES [ x] NO    MEDICATIONS  (STANDING):  amoxicillin  875 milliGRAM(s)/clavulanate 1 Tablet(s) Oral every 12 hours  enoxaparin Injectable 40 milliGRAM(s) SubCutaneous daily  influenza   Vaccine 0.5 milliLiter(s) IntraMuscular once  insulin lispro (HumaLOG) corrective regimen sliding scale   SubCutaneous every 6 hours  metFORMIN 500 milliGRAM(s) Oral two times a day with meals  nicotine - 21 mG/24Hr(s) Patch 1 patch Transdermal daily  pantoprazole    Tablet 40 milliGRAM(s) Oral before breakfast  saccharomyces boulardii 250 milliGRAM(s) Oral two times a day    MEDICATIONS  (PRN):  acetaminophen   Tablet. 650 milliGRAM(s) Oral every 6 hours PRN Mild Pain (1 - 3)      Vital Signs Last 24 Hrs  T(C): 37.1 (14 Mar 2018 09:18), Max: 37.1 (13 Mar 2018 12:42)  T(F): 98.8 (14 Mar 2018 09:18), Max: 98.8 (14 Mar 2018 09:18)  HR: 69 (14 Mar 2018 09:18) (68 - 77)  BP: 121/62 (14 Mar 2018 09:18) (115/70 - 137/78)  BP(mean): 86 (13 Mar 2018 12:00) (86 - 102)  RR: 18 (14 Mar 2018 09:18) (18 - 23)  SpO2: 99% (14 Mar 2018 09:18) (96% - 99%)    PHYSICAL EXAM:      Constitutional: NAD    ENT: exquisitely tender R perimandibular area    Respiratory: CTAB    Cardiovascular: S1S2    Gastrointestinal: soft, NT/ND    Extremities: no edema            I&O's Detail    13 Mar 2018 07:01  -  14 Mar 2018 07:00  --------------------------------------------------------  IN:    multiple electrolytes Injection Type 1multiple electrolytes Injection Type 1: 100 mL  Total IN: 100 mL    OUT:    Voided: 750 mL  Total OUT: 750 mL    Total NET: -650 mL          LABS:                        13.9   6.6   )-----------( 141      ( 14 Mar 2018 06:50 )             40.5     03-14    141  |  106  |  15.0  ----------------------------<  136<H>  3.8   |  22.0  |  0.62    Ca    9.0      14 Mar 2018 06:50  Mg     2.0     03-14            RADIOLOGY & ADDITIONAL STUDIES:

## 2018-03-14 NOTE — DISCHARGE NOTE ADULT - SECONDARY DIAGNOSIS.
Type 2 diabetes mellitus without complication, unspecified long term insulin use status Jaw swelling

## 2018-03-14 NOTE — PROGRESS NOTE ADULT - ASSESSMENT
48 y/o M PMHx HTN, HLD, DM admit with mandibular pain, difficulty breathing, found with cellulitis of the area with enlarged palantine tonsils partially obstructing the airway, initially admitted to SICU

## 2018-03-14 NOTE — DISCHARGE NOTE ADULT - MEDICATION SUMMARY - MEDICATIONS TO STOP TAKING
I will STOP taking the medications listed below when I get home from the hospital:  None I will STOP taking the medications listed below when I get home from the hospital:    Keflex 500 mg oral capsule  -- 1 cap(s) by mouth 4 times a day  -- Finish all this medication unless otherwise directed by prescriber.

## 2018-03-16 LAB
CULTURE RESULTS: SIGNIFICANT CHANGE UP
CULTURE RESULTS: SIGNIFICANT CHANGE UP
SPECIMEN SOURCE: SIGNIFICANT CHANGE UP
SPECIMEN SOURCE: SIGNIFICANT CHANGE UP

## 2019-02-19 ENCOUNTER — EMERGENCY (EMERGENCY)
Facility: HOSPITAL | Age: 49
LOS: 1 days | Discharge: HOME | End: 2019-02-19
Attending: STUDENT IN AN ORGANIZED HEALTH CARE EDUCATION/TRAINING PROGRAM
Payer: COMMERCIAL

## 2019-02-19 VITALS
HEART RATE: 85 BPM | SYSTOLIC BLOOD PRESSURE: 130 MMHG | OXYGEN SATURATION: 98 % | TEMPERATURE: 98 F | RESPIRATION RATE: 18 BRPM | DIASTOLIC BLOOD PRESSURE: 86 MMHG

## 2019-02-19 VITALS
SYSTOLIC BLOOD PRESSURE: 143 MMHG | TEMPERATURE: 98 F | RESPIRATION RATE: 18 BRPM | WEIGHT: 179.9 LBS | HEIGHT: 67 IN | OXYGEN SATURATION: 97 % | DIASTOLIC BLOOD PRESSURE: 93 MMHG | HEART RATE: 110 BPM

## 2019-02-19 PROBLEM — E78.00 PURE HYPERCHOLESTEROLEMIA, UNSPECIFIED: Chronic | Status: ACTIVE | Noted: 2018-03-11

## 2019-02-19 PROBLEM — I10 ESSENTIAL (PRIMARY) HYPERTENSION: Chronic | Status: ACTIVE | Noted: 2018-03-11

## 2019-02-19 LAB
ALBUMIN SERPL ELPH-MCNC: 4.6 G/DL — SIGNIFICANT CHANGE UP (ref 3.3–5.2)
ALP SERPL-CCNC: 137 U/L — HIGH (ref 40–120)
ALT FLD-CCNC: 27 U/L — SIGNIFICANT CHANGE UP
ANION GAP SERPL CALC-SCNC: 11 MMOL/L — SIGNIFICANT CHANGE UP (ref 5–17)
APPEARANCE UR: CLEAR — SIGNIFICANT CHANGE UP
AST SERPL-CCNC: 23 U/L — SIGNIFICANT CHANGE UP
BACTERIA # UR AUTO: ABNORMAL
BASOPHILS # BLD AUTO: 0.1 K/UL — SIGNIFICANT CHANGE UP (ref 0–0.2)
BASOPHILS NFR BLD AUTO: 0.9 % — SIGNIFICANT CHANGE UP (ref 0–2)
BILIRUB SERPL-MCNC: 0.3 MG/DL — LOW (ref 0.4–2)
BILIRUB UR-MCNC: NEGATIVE — SIGNIFICANT CHANGE UP
BUN SERPL-MCNC: 21 MG/DL — HIGH (ref 8–20)
CALCIUM SERPL-MCNC: 9.3 MG/DL — SIGNIFICANT CHANGE UP (ref 8.6–10.2)
CHLORIDE SERPL-SCNC: 105 MMOL/L — SIGNIFICANT CHANGE UP (ref 98–107)
CO2 SERPL-SCNC: 23 MMOL/L — SIGNIFICANT CHANGE UP (ref 22–29)
COLOR SPEC: YELLOW — SIGNIFICANT CHANGE UP
CREAT SERPL-MCNC: 0.94 MG/DL — SIGNIFICANT CHANGE UP (ref 0.5–1.3)
DIFF PNL FLD: ABNORMAL
EOSINOPHIL # BLD AUTO: 0.8 K/UL — HIGH (ref 0–0.5)
EOSINOPHIL NFR BLD AUTO: 10.9 % — HIGH (ref 0–5)
EPI CELLS # UR: SIGNIFICANT CHANGE UP
GLUCOSE SERPL-MCNC: 161 MG/DL — HIGH (ref 70–115)
GLUCOSE UR QL: NEGATIVE MG/DL — SIGNIFICANT CHANGE UP
HCT VFR BLD CALC: 42.7 % — SIGNIFICANT CHANGE UP (ref 42–52)
HGB BLD-MCNC: 14.5 G/DL — SIGNIFICANT CHANGE UP (ref 14–18)
KETONES UR-MCNC: ABNORMAL
LEUKOCYTE ESTERASE UR-ACNC: ABNORMAL
LIDOCAIN IGE QN: 52 U/L — HIGH (ref 22–51)
LYMPHOCYTES # BLD AUTO: 1.6 K/UL — SIGNIFICANT CHANGE UP (ref 1–4.8)
LYMPHOCYTES # BLD AUTO: 20.5 % — SIGNIFICANT CHANGE UP (ref 20–55)
MCHC RBC-ENTMCNC: 26.1 PG — LOW (ref 27–31)
MCHC RBC-ENTMCNC: 34 G/DL — SIGNIFICANT CHANGE UP (ref 32–36)
MCV RBC AUTO: 76.8 FL — LOW (ref 80–94)
MONOCYTES # BLD AUTO: 0.6 K/UL — SIGNIFICANT CHANGE UP (ref 0–0.8)
MONOCYTES NFR BLD AUTO: 7.4 % — SIGNIFICANT CHANGE UP (ref 3–10)
NEUTROPHILS # BLD AUTO: 4.6 K/UL — SIGNIFICANT CHANGE UP (ref 1.8–8)
NEUTROPHILS NFR BLD AUTO: 60 % — SIGNIFICANT CHANGE UP (ref 37–73)
NITRITE UR-MCNC: NEGATIVE — SIGNIFICANT CHANGE UP
PH UR: 5 — SIGNIFICANT CHANGE UP (ref 5–8)
PLATELET # BLD AUTO: 128 K/UL — LOW (ref 150–400)
POTASSIUM SERPL-MCNC: 4 MMOL/L — SIGNIFICANT CHANGE UP (ref 3.5–5.3)
POTASSIUM SERPL-SCNC: 4 MMOL/L — SIGNIFICANT CHANGE UP (ref 3.5–5.3)
PROT SERPL-MCNC: 7.1 G/DL — SIGNIFICANT CHANGE UP (ref 6.6–8.7)
PROT UR-MCNC: 30 MG/DL
RBC # BLD: 5.56 M/UL — SIGNIFICANT CHANGE UP (ref 4.6–6.2)
RBC # FLD: 14.1 % — SIGNIFICANT CHANGE UP (ref 11–15.6)
RBC CASTS # UR COMP ASSIST: ABNORMAL /HPF (ref 0–4)
SODIUM SERPL-SCNC: 139 MMOL/L — SIGNIFICANT CHANGE UP (ref 135–145)
SP GR SPEC: 1.02 — SIGNIFICANT CHANGE UP (ref 1.01–1.02)
UROBILINOGEN FLD QL: NEGATIVE MG/DL — SIGNIFICANT CHANGE UP
WBC # BLD: 7.6 K/UL — SIGNIFICANT CHANGE UP (ref 4.8–10.8)
WBC # FLD AUTO: 7.6 K/UL — SIGNIFICANT CHANGE UP (ref 4.8–10.8)
WBC UR QL: SIGNIFICANT CHANGE UP

## 2019-02-19 PROCEDURE — 74176 CT ABD & PELVIS W/O CONTRAST: CPT

## 2019-02-19 PROCEDURE — 83690 ASSAY OF LIPASE: CPT

## 2019-02-19 PROCEDURE — 81001 URINALYSIS AUTO W/SCOPE: CPT

## 2019-02-19 PROCEDURE — 99284 EMERGENCY DEPT VISIT MOD MDM: CPT | Mod: 25

## 2019-02-19 PROCEDURE — 85027 COMPLETE CBC AUTOMATED: CPT

## 2019-02-19 PROCEDURE — 74176 CT ABD & PELVIS W/O CONTRAST: CPT | Mod: 26

## 2019-02-19 PROCEDURE — 80053 COMPREHEN METABOLIC PANEL: CPT

## 2019-02-19 PROCEDURE — T1013: CPT

## 2019-02-19 PROCEDURE — 99285 EMERGENCY DEPT VISIT HI MDM: CPT | Mod: 25

## 2019-02-19 PROCEDURE — 96375 TX/PRO/DX INJ NEW DRUG ADDON: CPT

## 2019-02-19 PROCEDURE — 96374 THER/PROPH/DIAG INJ IV PUSH: CPT

## 2019-02-19 PROCEDURE — 36415 COLL VENOUS BLD VENIPUNCTURE: CPT

## 2019-02-19 RX ORDER — SEMAGLUTIDE 0.68 MG/ML
0 INJECTION, SOLUTION SUBCUTANEOUS
Qty: 0 | Refills: 0 | COMMUNITY

## 2019-02-19 RX ORDER — KETOROLAC TROMETHAMINE 30 MG/ML
15 SYRINGE (ML) INJECTION ONCE
Qty: 0 | Refills: 0 | Status: DISCONTINUED | OUTPATIENT
Start: 2019-02-19 | End: 2019-02-19

## 2019-02-19 RX ORDER — SODIUM CHLORIDE 9 MG/ML
3 INJECTION INTRAMUSCULAR; INTRAVENOUS; SUBCUTANEOUS ONCE
Qty: 0 | Refills: 0 | Status: COMPLETED | OUTPATIENT
Start: 2019-02-19 | End: 2019-02-19

## 2019-02-19 RX ORDER — MORPHINE SULFATE 50 MG/1
4 CAPSULE, EXTENDED RELEASE ORAL ONCE
Qty: 0 | Refills: 0 | Status: DISCONTINUED | OUTPATIENT
Start: 2019-02-19 | End: 2019-02-19

## 2019-02-19 RX ORDER — METFORMIN HYDROCHLORIDE 850 MG/1
1 TABLET ORAL
Qty: 0 | Refills: 0 | COMMUNITY

## 2019-02-19 RX ORDER — ONDANSETRON 8 MG/1
4 TABLET, FILM COATED ORAL ONCE
Qty: 0 | Refills: 0 | Status: COMPLETED | OUTPATIENT
Start: 2019-02-19 | End: 2019-02-19

## 2019-02-19 RX ORDER — ICOSAPENT ETHYL 500 MG/1
2 CAPSULE, LIQUID FILLED ORAL
Qty: 0 | Refills: 0 | COMMUNITY

## 2019-02-19 RX ORDER — DOCUSATE SODIUM 100 MG
1 CAPSULE ORAL
Qty: 28 | Refills: 0 | OUTPATIENT
Start: 2019-02-19 | End: 2019-03-04

## 2019-02-19 RX ORDER — IBUPROFEN 200 MG
1 TABLET ORAL
Qty: 20 | Refills: 0 | OUTPATIENT
Start: 2019-02-19 | End: 2019-02-23

## 2019-02-19 RX ORDER — DOCUSATE SODIUM 100 MG
1 CAPSULE ORAL
Qty: 20 | Refills: 0 | OUTPATIENT
Start: 2019-02-19 | End: 2019-02-28

## 2019-02-19 RX ORDER — LEVOTHYROXINE SODIUM 125 MCG
1 TABLET ORAL
Qty: 0 | Refills: 0 | COMMUNITY

## 2019-02-19 RX ADMIN — SODIUM CHLORIDE 3 MILLILITER(S): 9 INJECTION INTRAMUSCULAR; INTRAVENOUS; SUBCUTANEOUS at 03:10

## 2019-02-19 RX ADMIN — ONDANSETRON 4 MILLIGRAM(S): 8 TABLET, FILM COATED ORAL at 03:10

## 2019-02-19 RX ADMIN — MORPHINE SULFATE 4 MILLIGRAM(S): 50 CAPSULE, EXTENDED RELEASE ORAL at 03:10

## 2019-02-19 RX ADMIN — Medication 15 MILLIGRAM(S): at 03:10

## 2019-02-19 NOTE — ED ADULT NURSE NOTE - OBJECTIVE STATEMENT
pt A+Ox4, no apparent distress noted. wife at bedside. pt c/o bilateral flank pain since midnight. c/o N/V, denies urinary symptoms. pt breathing even and unlabored, BEARD wellx4. pt educated on POC, verbalized understanding. pt safety measures maintained.

## 2019-02-19 NOTE — ED PROVIDER NOTE - CONSTITUTIONAL, MLM
normal... Uncomfortable appearing, well nourished, awake, alert, oriented to person, place, time/situation and moderate painful distress.

## 2019-02-19 NOTE — ED ADULT NURSE NOTE - CHIEF COMPLAINT QUOTE
"I have back pain like when I have kidney stones" c/o lower back pain radiating to testicles and difficulty urinating. Pt reports dribbling and urinary frequency, hx of kidney stones. Appears in obvious discomfort.

## 2019-02-19 NOTE — ED PROVIDER NOTE - OBJECTIVE STATEMENT
47 y/o M presents to ED c/o back pain that radiates to the testicles onset today, worsening since it began. States he has a history of kidney stones and his current pain is similar to when he has had them in the past. Is also currently experiencing urinary frequency and "dribbling". Denies fevers, chills, abdominal pain, nausea, vomiting, diarrhea or hematuria. Did not take medications OTC for pain PTA. No further acute complaints at this time.    : Marlen 49 y/o M presents to ED c/o back pain that radiates to the testicles onset today, worsening since it began around 12am. States he has a history of kidney stones and his current pain is similar to when he has had them in the past. Is also currently experiencing urinary frequency and "dribbling". Denies fevers, chills, abdominal pain, nausea, vomiting, diarrhea or hematuria. Did not take medications OTC for pain PTA. No further acute complaints at this time.    : Marlen

## 2019-12-06 NOTE — ED PROVIDER NOTE - MDM ORDERS SUBMITTED SELECTION
Patient contact and updated with PCP recommendations. Patient is in agreement with going to ED for assessment  today.   Labs/Medications/Imaging Studies

## 2020-08-10 NOTE — ED ADULT NURSE NOTE - NSIMPLEMENTINTERV_GEN_ALL_ED
Never smoker
Implemented All Universal Safety Interventions:  [place] to call system. Call bell, personal items and telephone within reach. Instruct patient to call for assistance. Room bathroom lighting operational. Non-slip footwear when patient is off stretcher. Physically safe environment: no spills, clutter or unnecessary equipment. Stretcher in lowest position, wheels locked, appropriate side rails in place.

## 2020-08-19 NOTE — ED ADULT NURSE NOTE - BREATH SOUNDS, MLM
Clear Melolabial Interpolation Flap Text: A decision was made to reconstruct the defect utilizing an interpolation axial flap and a staged reconstruction.  A telfa template was made of the defect.  This telfa template was then used to outline the melolabial interpolation flap.  The donor area for the pedicle flap was then injected with anesthesia.  The flap was excised through the skin and subcutaneous tissue down to the layer of the underlying musculature.  The pedicle flap was carefully excised within this deep plane to maintain its blood supply.  The edges of the donor site were undermined.   The donor site was closed in a primary fashion.  The pedicle was then rotated into position and sutured.  Once the tube was sutured into place, adequate blood supply was confirmed with blanching and refill.  The pedicle was then wrapped with xeroform gauze and dressed appropriately with a telfa and gauze bandage to ensure continued blood supply and protect the attached pedicle.

## 2021-10-16 ENCOUNTER — EMERGENCY (EMERGENCY)
Facility: HOSPITAL | Age: 51
LOS: 1 days | Discharge: HOME | End: 2021-10-16
Attending: EMERGENCY MEDICINE
Payer: COMMERCIAL

## 2021-10-16 VITALS
HEART RATE: 92 BPM | DIASTOLIC BLOOD PRESSURE: 78 MMHG | RESPIRATION RATE: 22 BRPM | TEMPERATURE: 99 F | OXYGEN SATURATION: 98 % | HEIGHT: 67 IN | WEIGHT: 192.9 LBS | SYSTOLIC BLOOD PRESSURE: 119 MMHG

## 2021-10-16 LAB
ALBUMIN SERPL ELPH-MCNC: 3.5 G/DL — SIGNIFICANT CHANGE UP (ref 3.3–5.2)
ALP SERPL-CCNC: 134 U/L — HIGH (ref 40–120)
ALT FLD-CCNC: 29 U/L — SIGNIFICANT CHANGE UP
ANION GAP SERPL CALC-SCNC: 16 MMOL/L — SIGNIFICANT CHANGE UP (ref 5–17)
APPEARANCE UR: CLEAR — SIGNIFICANT CHANGE UP
AST SERPL-CCNC: 29 U/L — SIGNIFICANT CHANGE UP
BASOPHILS # BLD AUTO: 0.06 K/UL — SIGNIFICANT CHANGE UP (ref 0–0.2)
BASOPHILS NFR BLD AUTO: 0.6 % — SIGNIFICANT CHANGE UP (ref 0–2)
BILIRUB SERPL-MCNC: 0.6 MG/DL — SIGNIFICANT CHANGE UP (ref 0.4–2)
BILIRUB UR-MCNC: NEGATIVE — SIGNIFICANT CHANGE UP
BUN SERPL-MCNC: 9.1 MG/DL — SIGNIFICANT CHANGE UP (ref 8–20)
CALCIUM SERPL-MCNC: 6.9 MG/DL — LOW (ref 8.6–10.2)
CHLORIDE SERPL-SCNC: 108 MMOL/L — HIGH (ref 98–107)
CO2 SERPL-SCNC: 15 MMOL/L — LOW (ref 22–29)
COLOR SPEC: YELLOW — SIGNIFICANT CHANGE UP
CREAT SERPL-MCNC: 0.64 MG/DL — SIGNIFICANT CHANGE UP (ref 0.5–1.3)
DIFF PNL FLD: ABNORMAL
EOSINOPHIL # BLD AUTO: 0.07 K/UL — SIGNIFICANT CHANGE UP (ref 0–0.5)
EOSINOPHIL NFR BLD AUTO: 0.7 % — SIGNIFICANT CHANGE UP (ref 0–6)
EPI CELLS # UR: SIGNIFICANT CHANGE UP
GLUCOSE SERPL-MCNC: 226 MG/DL — HIGH (ref 70–99)
GLUCOSE UR QL: 1000 MG/DL
HCT VFR BLD CALC: 42.3 % — SIGNIFICANT CHANGE UP (ref 39–50)
HGB BLD-MCNC: 14.4 G/DL — SIGNIFICANT CHANGE UP (ref 13–17)
HIV 1 & 2 AB SERPL IA.RAPID: SIGNIFICANT CHANGE UP
IMM GRANULOCYTES NFR BLD AUTO: 0.4 % — SIGNIFICANT CHANGE UP (ref 0–1.5)
KETONES UR-MCNC: NEGATIVE — SIGNIFICANT CHANGE UP
LEUKOCYTE ESTERASE UR-ACNC: ABNORMAL
LYMPHOCYTES # BLD AUTO: 1.21 K/UL — SIGNIFICANT CHANGE UP (ref 1–3.3)
LYMPHOCYTES # BLD AUTO: 12.2 % — LOW (ref 13–44)
MCHC RBC-ENTMCNC: 26.4 PG — LOW (ref 27–34)
MCHC RBC-ENTMCNC: 34 GM/DL — SIGNIFICANT CHANGE UP (ref 32–36)
MCV RBC AUTO: 77.6 FL — LOW (ref 80–100)
MONOCYTES # BLD AUTO: 0.67 K/UL — SIGNIFICANT CHANGE UP (ref 0–0.9)
MONOCYTES NFR BLD AUTO: 6.8 % — SIGNIFICANT CHANGE UP (ref 2–14)
NEUTROPHILS # BLD AUTO: 7.84 K/UL — HIGH (ref 1.8–7.4)
NEUTROPHILS NFR BLD AUTO: 79.3 % — HIGH (ref 43–77)
NITRITE UR-MCNC: NEGATIVE — SIGNIFICANT CHANGE UP
PH UR: 6 — SIGNIFICANT CHANGE UP (ref 5–8)
PLATELET # BLD AUTO: 135 K/UL — LOW (ref 150–400)
POTASSIUM SERPL-MCNC: 3.2 MMOL/L — LOW (ref 3.5–5.3)
POTASSIUM SERPL-SCNC: 3.2 MMOL/L — LOW (ref 3.5–5.3)
PROT SERPL-MCNC: 5.4 G/DL — LOW (ref 6.6–8.7)
PROT UR-MCNC: 100 MG/DL
RBC # BLD: 5.45 M/UL — SIGNIFICANT CHANGE UP (ref 4.2–5.8)
RBC # FLD: 14.6 % — HIGH (ref 10.3–14.5)
RBC CASTS # UR COMP ASSIST: ABNORMAL /HPF (ref 0–4)
SODIUM SERPL-SCNC: 139 MMOL/L — SIGNIFICANT CHANGE UP (ref 135–145)
SP GR SPEC: 1.02 — SIGNIFICANT CHANGE UP (ref 1.01–1.02)
UROBILINOGEN FLD QL: NEGATIVE MG/DL — SIGNIFICANT CHANGE UP
WBC # BLD: 9.89 K/UL — SIGNIFICANT CHANGE UP (ref 3.8–10.5)
WBC # FLD AUTO: 9.89 K/UL — SIGNIFICANT CHANGE UP (ref 3.8–10.5)
WBC UR QL: SIGNIFICANT CHANGE UP

## 2021-10-16 PROCEDURE — T1013: CPT

## 2021-10-16 PROCEDURE — 36415 COLL VENOUS BLD VENIPUNCTURE: CPT

## 2021-10-16 PROCEDURE — 96374 THER/PROPH/DIAG INJ IV PUSH: CPT

## 2021-10-16 PROCEDURE — 80053 COMPREHEN METABOLIC PANEL: CPT

## 2021-10-16 PROCEDURE — 74176 CT ABD & PELVIS W/O CONTRAST: CPT | Mod: MA

## 2021-10-16 PROCEDURE — 86703 HIV-1/HIV-2 1 RESULT ANTBDY: CPT

## 2021-10-16 PROCEDURE — 81001 URINALYSIS AUTO W/SCOPE: CPT

## 2021-10-16 PROCEDURE — 85025 COMPLETE CBC W/AUTO DIFF WBC: CPT

## 2021-10-16 PROCEDURE — 99285 EMERGENCY DEPT VISIT HI MDM: CPT

## 2021-10-16 PROCEDURE — 96375 TX/PRO/DX INJ NEW DRUG ADDON: CPT

## 2021-10-16 PROCEDURE — 74176 CT ABD & PELVIS W/O CONTRAST: CPT | Mod: 26,MA

## 2021-10-16 PROCEDURE — 99284 EMERGENCY DEPT VISIT MOD MDM: CPT | Mod: 25

## 2021-10-16 RX ORDER — HYDROMORPHONE HYDROCHLORIDE 2 MG/ML
0.5 INJECTION INTRAMUSCULAR; INTRAVENOUS; SUBCUTANEOUS ONCE
Refills: 0 | Status: DISCONTINUED | OUTPATIENT
Start: 2021-10-16 | End: 2021-10-16

## 2021-10-16 RX ORDER — KETOROLAC TROMETHAMINE 30 MG/ML
30 SYRINGE (ML) INJECTION ONCE
Refills: 0 | Status: DISCONTINUED | OUTPATIENT
Start: 2021-10-16 | End: 2021-10-16

## 2021-10-16 RX ORDER — OXYCODONE AND ACETAMINOPHEN 5; 325 MG/1; MG/1
1 TABLET ORAL
Qty: 16 | Refills: 0
Start: 2021-10-16 | End: 2021-10-19

## 2021-10-16 RX ORDER — TAMSULOSIN HYDROCHLORIDE 0.4 MG/1
0.4 CAPSULE ORAL AT BEDTIME
Refills: 0 | Status: DISCONTINUED | OUTPATIENT
Start: 2021-10-16 | End: 2021-10-21

## 2021-10-16 RX ORDER — MORPHINE SULFATE 50 MG/1
4 CAPSULE, EXTENDED RELEASE ORAL ONCE
Refills: 0 | Status: DISCONTINUED | OUTPATIENT
Start: 2021-10-16 | End: 2021-10-16

## 2021-10-16 RX ORDER — SODIUM CHLORIDE 9 MG/ML
1000 INJECTION INTRAMUSCULAR; INTRAVENOUS; SUBCUTANEOUS ONCE
Refills: 0 | Status: COMPLETED | OUTPATIENT
Start: 2021-10-16 | End: 2021-10-16

## 2021-10-16 RX ORDER — ONDANSETRON 8 MG/1
4 TABLET, FILM COATED ORAL ONCE
Refills: 0 | Status: COMPLETED | OUTPATIENT
Start: 2021-10-16 | End: 2021-10-16

## 2021-10-16 RX ORDER — TAMSULOSIN HYDROCHLORIDE 0.4 MG/1
1 CAPSULE ORAL
Qty: 7 | Refills: 0
Start: 2021-10-16 | End: 2021-10-22

## 2021-10-16 RX ADMIN — ONDANSETRON 4 MILLIGRAM(S): 8 TABLET, FILM COATED ORAL at 14:30

## 2021-10-16 RX ADMIN — SODIUM CHLORIDE 1000 MILLILITER(S): 9 INJECTION INTRAMUSCULAR; INTRAVENOUS; SUBCUTANEOUS at 19:32

## 2021-10-16 RX ADMIN — Medication 30 MILLIGRAM(S): at 19:32

## 2021-10-16 RX ADMIN — SODIUM CHLORIDE 1000 MILLILITER(S): 9 INJECTION INTRAMUSCULAR; INTRAVENOUS; SUBCUTANEOUS at 14:27

## 2021-10-16 RX ADMIN — HYDROMORPHONE HYDROCHLORIDE 0.5 MILLIGRAM(S): 2 INJECTION INTRAMUSCULAR; INTRAVENOUS; SUBCUTANEOUS at 15:49

## 2021-10-16 RX ADMIN — TAMSULOSIN HYDROCHLORIDE 0.4 MILLIGRAM(S): 0.4 CAPSULE ORAL at 21:35

## 2021-10-16 RX ADMIN — MORPHINE SULFATE 4 MILLIGRAM(S): 50 CAPSULE, EXTENDED RELEASE ORAL at 14:29

## 2021-10-16 NOTE — ED STATDOCS - PROGRESS NOTE DETAILS
Pt moved form intake Room. Pt seen and evaluated by intake Physician. HPI, Physical examination performed by intake Physician . Note reviewed and followup examination performed by me consistent with initial assessment. Agrees with intake Physician plan and tests. Pt still has pain on left side . Pt looks uncomfortable . Pt medicated with Morphine 4mg and Zofran 4mg.   Pt will be treated with Toradol 30mg IV . Pt still has pain on left side . Pt looks uncomfortable . Pt medicated with Morphine 4mg and Zofran 4mg.   Pt will be treated with Hydromorphone 0.5mg. IV. Pt CT + Left mild Hydro and 2 4mm stones noted at the level of the distal ureter and UVJ. Pt made aware and Urology consulted. IV hydration with Normal saline, Toradol 30 mg IV and Flomax . Re-evaluate pain to determine admission or outpatient clinic.

## 2021-10-16 NOTE — ED STATDOCS - CARE PLAN
Principal Discharge DX:	Obstructive uropathy   1 Principal Discharge DX:	Obstructive uropathy  Secondary Diagnosis:	Hydronephrosis

## 2021-10-16 NOTE — ED STATDOCS - OBJECTIVE STATEMENT
52y/o M with PMHx of Kidney stones, DM, HTN presents to the ED c/o gradual onset left sided flank pain wrapping to groin and testicles which began this morning with assoc. nausea and episodes of vomiting. Additional c/o urinary burning and hesitancy. Pt states symptoms feel same as prior stones. Pt has prior history of Lithotripsy 3 years ago. Denies diarrhea.   : Jared

## 2021-10-16 NOTE — ED ADULT NURSE NOTE - OBJECTIVE STATEMENT
Pt c/o left flank pain with radiation to left abdomen.  Pt reports hx of kidney stones requiring surgery in the past.

## 2021-10-16 NOTE — ED STATDOCS - INTERNATIONAL TRAVEL
----- Message from Erica Simons MD sent at 8/28/2019 12:36 PM CDT -----  Please advise patient that results are abnormal.  Following recommendation should be given to the patient:  Persistent lipid panel but had step taking her medications statin because of myalgia I wonder if she can consider Crestor only about 10 mg a day instead with Zetia 10 mg a day and continue low-fat diet repeat blood tests in about 4 months or when she comes back to see me encourage eye checkup  
Results relayed to patient. Patient verbalized understanding.      Scripts sent to pharmacy at [facility].   
No

## 2021-10-16 NOTE — ED STATDOCS - PATIENT PORTAL LINK FT
You can access the FollowMyHealth Patient Portal offered by [facility] by registering at the following website: [email or web address]. By joining [employer]’s FollowMyHealth portal, you will also be able to view your health information using other applications (apps) compatible with our system.

## 2021-10-16 NOTE — ED ADULT NURSE NOTE - LANGUAGE ASSISTANCE NEEDED
Yes-Patient/Caregiver accepts free interpretation services...
Patient/Caregiver provided printed discharge information.

## 2021-10-16 NOTE — ED STATDOCS - ATTENDING CONTRIBUTION TO CARE
I, Jelena Carlisle, performed the initial face to face bedside interview with this patient regarding history of present illness, review of symptoms and relevant past medical, social and family history.  I completed an independent physical examination.  I was the initial provider who evaluated this patient. I have signed out the follow up of any pending tests (i.e. labs, radiological studies) to the ACP.  I have communicated the patient’s plan of care and disposition with the ACP.  The history, relevant review of systems, past medical and surgical history, medical decision making, and physical examination was documented by the scribe in my presence and I attest to the accuracy of the documentation.

## 2021-10-16 NOTE — ED STATDOCS - CARE PROVIDER_API CALL
Ian Hu  UROLOGY  [address] [address] [place] [address]  Phone: [phone]  Fax: [phone]  Follow Up Time:

## 2022-04-19 ENCOUNTER — EMERGENCY (EMERGENCY)
Facility: HOSPITAL | Age: 52
LOS: 1 days | Discharge: HOME | End: 2022-04-19
Attending: EMERGENCY MEDICINE
Payer: COMMERCIAL

## 2022-04-19 VITALS
WEIGHT: 192.02 LBS | HEIGHT: 67 IN | DIASTOLIC BLOOD PRESSURE: 79 MMHG | RESPIRATION RATE: 20 BRPM | SYSTOLIC BLOOD PRESSURE: 133 MMHG | HEART RATE: 90 BPM | OXYGEN SATURATION: 98 % | TEMPERATURE: 98 F

## 2022-04-19 PROCEDURE — 70450 CT HEAD/BRAIN W/O DYE: CPT | Mod: MG

## 2022-04-19 PROCEDURE — 73030 X-RAY EXAM OF SHOULDER: CPT | Mod: 26,LT

## 2022-04-19 PROCEDURE — 73060 X-RAY EXAM OF HUMERUS: CPT

## 2022-04-19 PROCEDURE — 99284 EMERGENCY DEPT VISIT MOD MDM: CPT | Mod: 25

## 2022-04-19 PROCEDURE — 73502 X-RAY EXAM HIP UNI 2-3 VIEWS: CPT

## 2022-04-19 PROCEDURE — 71045 X-RAY EXAM CHEST 1 VIEW: CPT | Mod: 26

## 2022-04-19 PROCEDURE — 73502 X-RAY EXAM HIP UNI 2-3 VIEWS: CPT | Mod: 26,LT

## 2022-04-19 PROCEDURE — 73030 X-RAY EXAM OF SHOULDER: CPT

## 2022-04-19 PROCEDURE — 73000 X-RAY EXAM OF COLLAR BONE: CPT

## 2022-04-19 PROCEDURE — 72125 CT NECK SPINE W/O DYE: CPT | Mod: MG

## 2022-04-19 PROCEDURE — 73060 X-RAY EXAM OF HUMERUS: CPT | Mod: 26,LT

## 2022-04-19 PROCEDURE — 72125 CT NECK SPINE W/O DYE: CPT | Mod: 26,MG

## 2022-04-19 PROCEDURE — G1004: CPT

## 2022-04-19 PROCEDURE — 73000 X-RAY EXAM OF COLLAR BONE: CPT | Mod: 26,LT

## 2022-04-19 PROCEDURE — 71045 X-RAY EXAM CHEST 1 VIEW: CPT

## 2022-04-19 PROCEDURE — 70450 CT HEAD/BRAIN W/O DYE: CPT | Mod: 26,MG

## 2022-04-19 PROCEDURE — 99285 EMERGENCY DEPT VISIT HI MDM: CPT

## 2022-04-19 RX ORDER — OXYCODONE HYDROCHLORIDE 5 MG/1
1 TABLET ORAL
Qty: 6 | Refills: 0
Start: 2022-04-19 | End: 2022-04-20

## 2022-04-19 RX ORDER — ACETAMINOPHEN 500 MG
650 TABLET ORAL ONCE
Refills: 0 | Status: COMPLETED | OUTPATIENT
Start: 2022-04-19 | End: 2022-04-19

## 2022-04-19 RX ORDER — OXYCODONE HYDROCHLORIDE 5 MG/1
5 TABLET ORAL ONCE
Refills: 0 | Status: DISCONTINUED | OUTPATIENT
Start: 2022-04-19 | End: 2022-04-19

## 2022-04-19 RX ADMIN — Medication 650 MILLIGRAM(S): at 12:36

## 2022-04-19 RX ADMIN — OXYCODONE HYDROCHLORIDE 5 MILLIGRAM(S): 5 TABLET ORAL at 12:36

## 2022-04-19 NOTE — ED ADULT TRIAGE NOTE - CHIEF COMPLAINT QUOTE
Patient BIBA to ED today after fall from a ladder about 8 feet onto his left side, left arm pain, left hip pain, no LOC, no neck pain or chest pain, no blood thinner use.

## 2022-04-19 NOTE — ED PROVIDER NOTE - PROGRESS NOTE DETAILS
Patient feeling much better. CT no acute findings- does show disc herniation however patient neuro intact, neck pain resolved. Will dc home with spine f/u. Angela Alcala DO

## 2022-04-19 NOTE — ED PROVIDER NOTE - PATIENT PORTAL LINK FT
You can access the FollowMyHealth Patient Portal offered by [facility] by registering at the following website: [email or web address]. By joining [employer]’s FollowMyHealth portal, you will also be able to view your health information using other applications (apps) compatible with our system.

## 2022-04-19 NOTE — ED PROVIDER NOTE - PHYSICAL EXAMINATION
Gen: NAD, AOx3  Head: NCAT  HEENT: c-collar in place, PERRL, oral mucosa moist, normal conjunctiva, oropharynx clear without exudate or erythema  Lung: CTAB, no respiratory distress, no wheezing, rales, rhonchi  CV: normal s1/s2, rrr, no murmurs, Normal perfusion  Abd: soft, NTND  MSK: No edema, no visible deformities, full range of motion in all 4 extremities  Neuro: +TTP midline c-spine, CN II-XII grossly intact, No focal neurologic deficits, strength 5/5 in all 4 extremities, sensation intact throughout  Skin: No rash   Psych: normal affect

## 2022-04-19 NOTE — ED PROVIDER NOTE - CARE PROVIDER_API CALL
Joss Corona)  Neurosurgery  [address] [place] [address], [address]  [place] [address]  Phone: [phone]  Fax: [phone]  Follow Up Time: Urgent

## 2022-04-19 NOTE — ED PROVIDER NOTE - OBJECTIVE STATEMENT
52yo male with PMH HTN, hyperlipidemia, DM, presenting with headache, neck pain, L shoulder pain, and L hip pain s/p mechanical fall off of ladder approx 8 ft high, patient fell onto L side, hit head, denies loss of consciousness. Takes baby ASA no other AC. No visual changes, nausea, vomiting. Patient states he has h/o L hip replacement in past.

## 2022-04-19 NOTE — ED PROVIDER NOTE - CLINICAL SUMMARY MEDICAL DECISION MAKING FREE TEXT BOX
Patient c/o multiple injuries s/p fall off of ladder approx 8 feet in height- will check CT head/cspine, CXR, XR shoulder/humerus, hip XR, pain control and reassess. Angela Alcala DO

## 2022-04-19 NOTE — ED ADULT NURSE NOTE - NSICDXPASTMEDICALHX_GEN_ALL_CORE_FT
PAST MEDICAL HISTORY:  Diabetes     High cholesterol     Hypertension     
Patient reports feeling improved. Dispo plan reviewed.

## 2022-04-19 NOTE — ED ADULT NURSE NOTE - OBJECTIVE STATEMENT
52 y/o male presents s/p fall from an 8foot ladder at work that gave way, pt slipped and fell on his left side, did not hurt his head, denies blood thinners. PT c/o back pain, left hip pain, of note pt had previous sx on left hip apx 12 years ago with metal rods in place. Pt unable to elevate left leg, denies numbness or tingling, pt c/o left shoulder pain. Pt with a cervical collar in place, awaiting diagnostic report, pt medicated. Wife at bedside.

## 2022-04-19 NOTE — ED ADULT NURSE NOTE - NSIMPLEMENTINTERV_GEN_ALL_ED
Implemented All Fall Risk Interventions:  [place] to call system. Call bell, personal items and telephone within reach. Instruct patient to call for assistance. Room bathroom lighting operational. Non-slip footwear when patient is off stretcher. Physically safe environment: no spills, clutter or unnecessary equipment. Stretcher in lowest position, wheels locked, appropriate side rails in place. Provide visual cue, wrist band, yellow gown, etc. Monitor gait and stability. Monitor for mental status changes and reorient to person, place, and time. Review medications for side effects contributing to fall risk. Reinforce activity limits and safety measures with patient and family.

## 2022-04-19 NOTE — ED PROVIDER NOTE - NSFOLLOWUPINSTRUCTIONS_ED_ALL_ED_FT
1. Follow up with a spine specialist within 2-3days for reevaluation.  2.  Return to the Emergency Department for worsening, progressive or any other concerning symptoms.   3.  Please take oxycodone as prescribed. Do not take this medication with alcohol or while driving as this will make you drowsy.   4.  Please take tylenol 650 mg every 4 hours as needed for pain. Please do not exceed more than 4,000mg of Tylenol in a day     1. Virgie un seguimiento con un especialista en columna dentro de 2 a 3 días para baljinder reevaluación.  2. Regrese al Departamento de Emergencias por empeoramiento, progresión o cualquier otro síntoma preocupante.  3. [place] la oxicodona según lo prescrito. No tome keila medicamento con alcohol o mientras conduce, ya que esto le producirá somnolencia.  4. [place] Tylenol 650 mg cada 4 horas según sea necesario para el dolor. No exceda más de 4,000 mg de Tylenol en un día    Contusión    Contusion      Baljinder contusión es un hematoma profundo. Las contusiones son el resultado de un traumatismo cerrado en los tejidos y las fibras musculares que están debajo de la piel. La lesión causa baljinder hemorragia debajo de la piel. La piel sobre la contusión puede ponerse de color rene, juliana o amarillo. Las lesiones menores le causarán baljinder contusión indolora; sin embargo, las lesiones más graves causan contusiones en las que el dolor y la hinchazón pueden prolongarse lino algunas semanas.      Siga estas indicaciones en amezquita casa:    Esté atento a cualquier cambio en los síntomas. Informe a amezquita médico acerca de josselin síntomas. [place] estas medidas para aliviar el dolor.      Control del dolor, el entumecimiento y la hinchazón    •Use reposo, aplicación de hielo y aplicación de presión (compresión), y poner en alto (elevar) la lyssa lesionada. Generalmente, esto se conoce maria elena la estrategia de RHCE.  •Mantenga la lyssa de la lesión en reposo. Retome josselin actividades normales según lo indicado por el médico. Pregúntele al médico qué actividades son seguras para usted.    •Si se lo indican, aplique hielo sobre la lyssa de la lesión:  •Ponga el hielo en baljinder bolsa plástica.      •Coloque baljinder toalla entre la piel y la bolsa.      •Coloque el hielo lino 20 minutos, 2 a 3 veces al día.        •Si se lo indican, ejerza baljinder compresión suave en la lyssa de la lesión con baljinder venda elástica. Asegúrese de que la venda no esté muy ajustada. Quítese y vuelva a colocarse la venda maria elena se lo haya indicado el médico.      •Si es posible, cuando esté sentado o acostado, levante (eleve) la lyssa lesionada por encima del nivel del corazón.        Indicaciones generales     •[place] los medicamentos de venta estephanie y los recetados solamente maria elena se lo haya indicado el médico.      •Concurra a todas las visitas de control maria elena se lo haya indicado el médico. [place] es importante.        Comuníquese con un médico si:    •Los síntomas no mejoran después de varios días de tratamiento.      •Josselin síntomas empeoran.      •Tiene dificultad para  la lyssa lesionada.        Solicite ayuda inmediatamente si:    •Siente dolor intenso.      •Siente adormecimiento en baljinder mano o un pie.      •La mano o el pie están pálidos o fríos.        Resumen    •Baljinder contusión es un hematoma profundo.      •Las contusiones son el resultado de un traumatismo cerrado en los tejidos y las fibras musculares que están debajo de la piel.      •El tratamiento es hacer reposo, aplicarse hielo, compresión y elevar la lyssa afectada. Es posible que le den analgésicos de venta estephanie.      •Comuníquese con un médico si los síntomas no mejoran o si empeoran.       •Solicite ayuda de inmediato si tiene dolor intenso, entumecimiento o si la lyssa se torna pálida o fría.      Esta información no tiene maria elena fin reemplazar el consejo del médico. Asegúrese de hacerle al médico cualquier pregunta que tenga.

## 2022-04-27 ENCOUNTER — APPOINTMENT (OUTPATIENT)
Dept: NEUROSURGERY | Facility: CLINIC | Age: 52
End: 2022-04-27

## 2022-10-06 ENCOUNTER — NON-APPOINTMENT (OUTPATIENT)
Age: 52
End: 2022-10-06

## 2022-12-25 ENCOUNTER — EMERGENCY (EMERGENCY)
Facility: HOSPITAL | Age: 52
LOS: 1 days | Discharge: HOME | End: 2022-12-25
Attending: STUDENT IN AN ORGANIZED HEALTH CARE EDUCATION/TRAINING PROGRAM
Payer: COMMERCIAL

## 2022-12-25 VITALS
TEMPERATURE: 98 F | SYSTOLIC BLOOD PRESSURE: 141 MMHG | HEART RATE: 87 BPM | DIASTOLIC BLOOD PRESSURE: 90 MMHG | OXYGEN SATURATION: 98 % | RESPIRATION RATE: 20 BRPM

## 2022-12-25 VITALS
RESPIRATION RATE: 20 BRPM | SYSTOLIC BLOOD PRESSURE: 147 MMHG | WEIGHT: 190.04 LBS | DIASTOLIC BLOOD PRESSURE: 97 MMHG | HEART RATE: 114 BPM | HEIGHT: 67 IN | TEMPERATURE: 98 F | OXYGEN SATURATION: 97 %

## 2022-12-25 LAB
ALBUMIN SERPL ELPH-MCNC: 4 G/DL — SIGNIFICANT CHANGE UP (ref 3.3–5.2)
ALBUMIN SERPL ELPH-MCNC: 4.7 G/DL — SIGNIFICANT CHANGE UP (ref 3.3–5.2)
ALP SERPL-CCNC: 152 U/L — HIGH (ref 40–120)
ALP SERPL-CCNC: 171 U/L — HIGH (ref 40–120)
ALT FLD-CCNC: 20 U/L — SIGNIFICANT CHANGE UP
ALT FLD-CCNC: 25 U/L — SIGNIFICANT CHANGE UP
ANION GAP SERPL CALC-SCNC: 14 MMOL/L — SIGNIFICANT CHANGE UP (ref 5–17)
ANION GAP SERPL CALC-SCNC: 27 MMOL/L — HIGH (ref 5–17)
AST SERPL-CCNC: 23 U/L — SIGNIFICANT CHANGE UP
AST SERPL-CCNC: 35 U/L — SIGNIFICANT CHANGE UP
BASE EXCESS BLDV CALC-SCNC: -3.7 MMOL/L — LOW (ref -2–3)
BASE EXCESS BLDV CALC-SCNC: -3.7 MMOL/L — LOW (ref -2–3)
BASOPHILS # BLD AUTO: 0.05 K/UL — SIGNIFICANT CHANGE UP (ref 0–0.2)
BASOPHILS NFR BLD AUTO: 0.7 % — SIGNIFICANT CHANGE UP (ref 0–2)
BILIRUB SERPL-MCNC: 0.8 MG/DL — SIGNIFICANT CHANGE UP (ref 0.4–2)
BILIRUB SERPL-MCNC: 0.9 MG/DL — SIGNIFICANT CHANGE UP (ref 0.4–2)
BUN SERPL-MCNC: 10.4 MG/DL — SIGNIFICANT CHANGE UP (ref 8–20)
BUN SERPL-MCNC: 8.9 MG/DL — SIGNIFICANT CHANGE UP (ref 8–20)
CA-I SERPL-SCNC: 1.07 MMOL/L — LOW (ref 1.15–1.33)
CA-I SERPL-SCNC: 1.1 MMOL/L — LOW (ref 1.15–1.33)
CALCIUM SERPL-MCNC: 8.4 MG/DL — SIGNIFICANT CHANGE UP (ref 8.4–10.5)
CALCIUM SERPL-MCNC: 9.6 MG/DL — SIGNIFICANT CHANGE UP (ref 8.4–10.5)
CHLORIDE BLDV-SCNC: 107 MMOL/L — SIGNIFICANT CHANGE UP (ref 96–108)
CHLORIDE BLDV-SCNC: 109 MMOL/L — HIGH (ref 96–108)
CHLORIDE SERPL-SCNC: 109 MMOL/L — HIGH (ref 96–108)
CHLORIDE SERPL-SCNC: 98 MMOL/L — SIGNIFICANT CHANGE UP (ref 96–108)
CO2 SERPL-SCNC: 15 MMOL/L — LOW (ref 22–29)
CO2 SERPL-SCNC: 20 MMOL/L — LOW (ref 22–29)
CREAT SERPL-MCNC: 0.58 MG/DL — SIGNIFICANT CHANGE UP (ref 0.5–1.3)
CREAT SERPL-MCNC: 0.7 MG/DL — SIGNIFICANT CHANGE UP (ref 0.5–1.3)
EGFR: 112 ML/MIN/1.73M2 — SIGNIFICANT CHANGE UP
EGFR: 118 ML/MIN/1.73M2 — SIGNIFICANT CHANGE UP
EOSINOPHIL # BLD AUTO: 0.22 K/UL — SIGNIFICANT CHANGE UP (ref 0–0.5)
EOSINOPHIL NFR BLD AUTO: 3 % — SIGNIFICANT CHANGE UP (ref 0–6)
GAS PNL BLDV: 140 MMOL/L — SIGNIFICANT CHANGE UP (ref 136–145)
GAS PNL BLDV: 141 MMOL/L — SIGNIFICANT CHANGE UP (ref 136–145)
GAS PNL BLDV: SIGNIFICANT CHANGE UP
GAS PNL BLDV: SIGNIFICANT CHANGE UP
GLUCOSE BLDV-MCNC: 226 MG/DL — HIGH (ref 70–99)
GLUCOSE BLDV-MCNC: 274 MG/DL — HIGH (ref 70–99)
GLUCOSE SERPL-MCNC: 227 MG/DL — HIGH (ref 70–99)
GLUCOSE SERPL-MCNC: 300 MG/DL — HIGH (ref 70–99)
HCO3 BLDV-SCNC: 21 MMOL/L — LOW (ref 22–29)
HCO3 BLDV-SCNC: 22 MMOL/L — SIGNIFICANT CHANGE UP (ref 22–29)
HCT VFR BLD CALC: 48 % — SIGNIFICANT CHANGE UP (ref 39–50)
HCT VFR BLDA CALC: 44 % — SIGNIFICANT CHANGE UP
HCT VFR BLDA CALC: 45 % — SIGNIFICANT CHANGE UP
HGB BLD CALC-MCNC: 14.8 G/DL — SIGNIFICANT CHANGE UP (ref 12.6–17.4)
HGB BLD CALC-MCNC: 14.9 G/DL — SIGNIFICANT CHANGE UP (ref 12.6–17.4)
HGB BLD-MCNC: 16.9 G/DL — SIGNIFICANT CHANGE UP (ref 13–17)
IMM GRANULOCYTES NFR BLD AUTO: 0.4 % — SIGNIFICANT CHANGE UP (ref 0–0.9)
LACTATE BLDV-MCNC: 3.4 MMOL/L — HIGH (ref 0.5–2)
LACTATE BLDV-MCNC: 4.3 MMOL/L — CRITICAL HIGH (ref 0.5–2)
LIDOCAIN IGE QN: 32 U/L — SIGNIFICANT CHANGE UP (ref 22–51)
LYMPHOCYTES # BLD AUTO: 2.26 K/UL — SIGNIFICANT CHANGE UP (ref 1–3.3)
LYMPHOCYTES # BLD AUTO: 30.8 % — SIGNIFICANT CHANGE UP (ref 13–44)
MAGNESIUM SERPL-MCNC: 1.9 MG/DL — SIGNIFICANT CHANGE UP (ref 1.6–2.6)
MCHC RBC-ENTMCNC: 26.8 PG — LOW (ref 27–34)
MCHC RBC-ENTMCNC: 35.2 GM/DL — SIGNIFICANT CHANGE UP (ref 32–36)
MCV RBC AUTO: 76.2 FL — LOW (ref 80–100)
MONOCYTES # BLD AUTO: 0.36 K/UL — SIGNIFICANT CHANGE UP (ref 0–0.9)
MONOCYTES NFR BLD AUTO: 4.9 % — SIGNIFICANT CHANGE UP (ref 2–14)
NEUTROPHILS # BLD AUTO: 4.41 K/UL — SIGNIFICANT CHANGE UP (ref 1.8–7.4)
NEUTROPHILS NFR BLD AUTO: 60.2 % — SIGNIFICANT CHANGE UP (ref 43–77)
PCO2 BLDV: 33 MMHG — LOW (ref 42–55)
PCO2 BLDV: 41 MMHG — LOW (ref 42–55)
PH BLDV: 7.34 — SIGNIFICANT CHANGE UP (ref 7.32–7.43)
PH BLDV: 7.41 — SIGNIFICANT CHANGE UP (ref 7.32–7.43)
PLATELET # BLD AUTO: 175 K/UL — SIGNIFICANT CHANGE UP (ref 150–400)
PO2 BLDV: 109 MMHG — HIGH (ref 25–45)
PO2 BLDV: 66 MMHG — HIGH (ref 25–45)
POTASSIUM BLDV-SCNC: 3.6 MMOL/L — SIGNIFICANT CHANGE UP (ref 3.5–5.1)
POTASSIUM BLDV-SCNC: 4.4 MMOL/L — SIGNIFICANT CHANGE UP (ref 3.5–5.1)
POTASSIUM SERPL-MCNC: 3.3 MMOL/L — LOW (ref 3.5–5.3)
POTASSIUM SERPL-MCNC: 4.3 MMOL/L — SIGNIFICANT CHANGE UP (ref 3.5–5.3)
POTASSIUM SERPL-SCNC: 3.3 MMOL/L — LOW (ref 3.5–5.3)
POTASSIUM SERPL-SCNC: 4.3 MMOL/L — SIGNIFICANT CHANGE UP (ref 3.5–5.3)
PROT SERPL-MCNC: 6.4 G/DL — LOW (ref 6.6–8.7)
PROT SERPL-MCNC: 7.5 G/DL — SIGNIFICANT CHANGE UP (ref 6.6–8.7)
RBC # BLD: 6.3 M/UL — HIGH (ref 4.2–5.8)
RBC # FLD: 13.5 % — SIGNIFICANT CHANGE UP (ref 10.3–14.5)
SAO2 % BLDV: 93.5 % — SIGNIFICANT CHANGE UP
SAO2 % BLDV: 99.3 % — SIGNIFICANT CHANGE UP
SODIUM SERPL-SCNC: 140 MMOL/L — SIGNIFICANT CHANGE UP (ref 135–145)
SODIUM SERPL-SCNC: 143 MMOL/L — SIGNIFICANT CHANGE UP (ref 135–145)
TROPONIN T SERPL-MCNC: <0.01 NG/ML — SIGNIFICANT CHANGE UP (ref 0–0.06)
WBC # BLD: 7.33 K/UL — SIGNIFICANT CHANGE UP (ref 3.8–10.5)
WBC # FLD AUTO: 7.33 K/UL — SIGNIFICANT CHANGE UP (ref 3.8–10.5)

## 2022-12-25 PROCEDURE — 84132 ASSAY OF SERUM POTASSIUM: CPT

## 2022-12-25 PROCEDURE — 96365 THER/PROPH/DIAG IV INF INIT: CPT

## 2022-12-25 PROCEDURE — 83690 ASSAY OF LIPASE: CPT

## 2022-12-25 PROCEDURE — 99285 EMERGENCY DEPT VISIT HI MDM: CPT

## 2022-12-25 PROCEDURE — 96361 HYDRATE IV INFUSION ADD-ON: CPT

## 2022-12-25 PROCEDURE — 85025 COMPLETE CBC W/AUTO DIFF WBC: CPT

## 2022-12-25 PROCEDURE — 82803 BLOOD GASES ANY COMBINATION: CPT

## 2022-12-25 PROCEDURE — 93010 ELECTROCARDIOGRAM REPORT: CPT | Mod: 77

## 2022-12-25 PROCEDURE — 36415 COLL VENOUS BLD VENIPUNCTURE: CPT

## 2022-12-25 PROCEDURE — 83735 ASSAY OF MAGNESIUM: CPT

## 2022-12-25 PROCEDURE — 96366 THER/PROPH/DIAG IV INF ADDON: CPT

## 2022-12-25 PROCEDURE — 84484 ASSAY OF TROPONIN QUANT: CPT

## 2022-12-25 PROCEDURE — 85014 HEMATOCRIT: CPT

## 2022-12-25 PROCEDURE — 84295 ASSAY OF SERUM SODIUM: CPT

## 2022-12-25 PROCEDURE — 85018 HEMOGLOBIN: CPT

## 2022-12-25 PROCEDURE — 96375 TX/PRO/DX INJ NEW DRUG ADDON: CPT

## 2022-12-25 PROCEDURE — 93010 ELECTROCARDIOGRAM REPORT: CPT

## 2022-12-25 PROCEDURE — 83605 ASSAY OF LACTIC ACID: CPT

## 2022-12-25 PROCEDURE — 99285 EMERGENCY DEPT VISIT HI MDM: CPT | Mod: 25

## 2022-12-25 PROCEDURE — 80053 COMPREHEN METABOLIC PANEL: CPT

## 2022-12-25 PROCEDURE — 71045 X-RAY EXAM CHEST 1 VIEW: CPT | Mod: 26

## 2022-12-25 PROCEDURE — 71045 X-RAY EXAM CHEST 1 VIEW: CPT

## 2022-12-25 PROCEDURE — 82330 ASSAY OF CALCIUM: CPT

## 2022-12-25 PROCEDURE — 93005 ELECTROCARDIOGRAM TRACING: CPT

## 2022-12-25 PROCEDURE — 82435 ASSAY OF BLOOD CHLORIDE: CPT

## 2022-12-25 PROCEDURE — 82962 GLUCOSE BLOOD TEST: CPT

## 2022-12-25 PROCEDURE — 96367 TX/PROPH/DG ADDL SEQ IV INF: CPT

## 2022-12-25 PROCEDURE — 82947 ASSAY GLUCOSE BLOOD QUANT: CPT

## 2022-12-25 RX ORDER — FAMOTIDINE 10 MG/ML
20 INJECTION INTRAVENOUS DAILY
Refills: 0 | Status: DISCONTINUED | OUTPATIENT
Start: 2022-12-25 | End: 2022-12-26

## 2022-12-25 RX ORDER — MAGNESIUM SULFATE 500 MG/ML
2 VIAL (ML) INJECTION ONCE
Refills: 0 | Status: COMPLETED | OUTPATIENT
Start: 2022-12-25 | End: 2022-12-25

## 2022-12-25 RX ORDER — SODIUM CHLORIDE 9 MG/ML
1000 INJECTION INTRAMUSCULAR; INTRAVENOUS; SUBCUTANEOUS ONCE
Refills: 0 | Status: COMPLETED | OUTPATIENT
Start: 2022-12-25 | End: 2022-12-25

## 2022-12-25 RX ORDER — ONDANSETRON 8 MG/1
4 TABLET, FILM COATED ORAL ONCE
Refills: 0 | Status: COMPLETED | OUTPATIENT
Start: 2022-12-25 | End: 2022-12-25

## 2022-12-25 RX ORDER — POTASSIUM CHLORIDE 20 MEQ
10 PACKET (EA) ORAL
Refills: 0 | Status: COMPLETED | OUTPATIENT
Start: 2022-12-25 | End: 2022-12-25

## 2022-12-25 RX ADMIN — FAMOTIDINE 20 MILLIGRAM(S): 10 INJECTION INTRAVENOUS at 08:47

## 2022-12-25 RX ADMIN — FAMOTIDINE 100 MILLIGRAM(S): 10 INJECTION INTRAVENOUS at 07:50

## 2022-12-25 RX ADMIN — Medication 30 MILLILITER(S): at 07:50

## 2022-12-25 RX ADMIN — Medication 2 MILLIGRAM(S): at 08:54

## 2022-12-25 RX ADMIN — Medication 100 MILLIEQUIVALENT(S): at 11:00

## 2022-12-25 RX ADMIN — Medication 100 MILLIEQUIVALENT(S): at 08:54

## 2022-12-25 RX ADMIN — Medication 10 MILLIEQUIVALENT(S): at 12:00

## 2022-12-25 RX ADMIN — SODIUM CHLORIDE 1000 MILLILITER(S): 9 INJECTION INTRAMUSCULAR; INTRAVENOUS; SUBCUTANEOUS at 07:50

## 2022-12-25 RX ADMIN — Medication 25 GRAM(S): at 08:55

## 2022-12-25 RX ADMIN — SODIUM CHLORIDE 1000 MILLILITER(S): 9 INJECTION INTRAMUSCULAR; INTRAVENOUS; SUBCUTANEOUS at 09:55

## 2022-12-25 RX ADMIN — SODIUM CHLORIDE 1000 MILLILITER(S): 9 INJECTION INTRAMUSCULAR; INTRAVENOUS; SUBCUTANEOUS at 08:47

## 2022-12-25 RX ADMIN — Medication 10 MILLIEQUIVALENT(S): at 09:54

## 2022-12-25 RX ADMIN — SODIUM CHLORIDE 1000 MILLILITER(S): 9 INJECTION INTRAMUSCULAR; INTRAVENOUS; SUBCUTANEOUS at 08:55

## 2022-12-25 RX ADMIN — Medication 100 MILLIEQUIVALENT(S): at 10:00

## 2022-12-25 RX ADMIN — Medication 10 MILLIEQUIVALENT(S): at 11:00

## 2022-12-25 RX ADMIN — ONDANSETRON 4 MILLIGRAM(S): 8 TABLET, FILM COATED ORAL at 07:51

## 2022-12-25 RX ADMIN — Medication 2 GRAM(S): at 10:55

## 2022-12-25 NOTE — ED PROVIDER NOTE - CLINICAL SUMMARY MEDICAL DECISION MAKING FREE TEXT BOX
Patient is a 53 yo male with PMHx Etoh abuse, DM, asthma, gastritis presenting with abdominal pain and n/v after drinking yesterday. Patient states he used to drink daily but didn ot have a drink until 5 days ago; patient has been drinking everyday since. Patient had several drinks last night at 9 PM and woke up PTA with multiple episodes of nonbilious nonbloody vomiting as well as nonspecific epigastric abdominal pain as well as dull CP. CIWA score 6 currently for anxiety, HA, n/v. VSS, uncomfortable appearing, oropharynx clear, anxious, lungs ctab, belly soft nontender, no peripheral edema.     Will hydrate, treat zofran and abdominal pain, r/o pancreatitis, evaluate for ACS, treat withdrawal, reassess.

## 2022-12-25 NOTE — ED PROVIDER NOTE - NSFOLLOWUPINSTRUCTIONS_ED_ALL_ED_FT
Dolor abdominal en los adultos    Abdominal Pain, Adult      El dolor en el abdomen (dolor abdominal) puede tener muchas causas. A menudo, el dolor abdominal no es grave y mejora sin tratamiento o con tratamiento en la casa. Sin embargo, a veces el dolor abdominal es grave.    El médico le hará preguntas sobre ne antecedentes médicos y le hará un examen físico para tratar de determinar la causa del dolor abdominal.      Siga estas instrucciones en amezquita casa:     Medicamentos     •Use los medicamentos de venta estephanie y los recetados solamente maria elena se lo haya indicado el médico.      • No tome un laxante a menos que se lo haya indicado el médico.      Instrucciones generales     •Controle amezquita afección para detectar cualquier cambio.      •Ronel suficiente líquido maria elena para mantener la orina de color amarillo pálido.      •Concurra a todas las visitas de seguimiento maria elena se lo haya indicado el médico. [place] es importante.        Comuníquese con un médico si:    •El dolor abdominal cambia o empeora.      •No tiene apetito o baja de peso sin proponérselo.      •Está estreñido o tiene diarrea lino más de 2 o 3 días.      •Tiene dolor cuando orina o defeca.      •El dolor abdominal lo despierta de noche.      •El dolor empeora con las comidas, después de comer o con determinados alimentos.      •Tiene vómitos y no puede retener nada de lo que ingiere.      •Tiene fiebre.      •Observa payton en la orina.        Solicite ayuda inmediatamente si:    •El dolor no desaparece tan pronto maria elena el médico le dijo que era esperable.      •No puede dejar de vomitar.      •El dolor se siente solo en zonas del abdomen, maria elena el lado derecho o la parte inferior izquierda del abdomen. Si se localiza en la lyssa derecha, posiblemente podría tratarse de apendicitis.      •Las heces son sanguinolentas o de color tonio, o de aspecto alquitranado.      •Tiene dolor intenso, cólicos o distensión abdominal.    •Tiene signos de deshidratación, maria elena los siguientes:  •Orina de color oscuro, muy escasa o falta de orina.      •Labios agrietados.      •Sequedad de boca.      •Ojos hundidos.      •Somnolencia.      •Debilidad.        •Tiene dificultad para respirar o dolor en el pecho.        Resumen    •A menudo, el dolor abdominal no es grave y mejora sin tratamiento o con tratamiento en la casa. Sin embargo, a veces el dolor abdominal es grave.      •Controle amezquita afección para detectar cualquier cambio.      •Use los medicamentos de venta estephanie y los recetados solamente maria elena se lo haya indicado el médico.      •Comuníquese con un médico si el dolor abdominal cambia o empeora.      •[place] ayuda de inmediato si tiene dolor intenso, cólicos o distensión abdominal.      Esta información no tiene maria elena fin reemplazar el consejo del médico. Asegúrese de hacerle al médico cualquier pregunta que tenga.

## 2022-12-25 NOTE — ED PROVIDER NOTE - PATIENT PORTAL LINK FT
You can access the FollowMyHealth Patient Portal offered by [facility] by registering at the following website: [email or web address]. By joining [employer]’s FollowMyHealth portal, you will also be able to view your health information using other applications (apps) compatible with our system.

## 2022-12-25 NOTE — ED PROVIDER NOTE - DATE/TIME OF ACCEPTANCE
Mandy Brandon Patient Age: 29 year old  MESSAGE:   Pt calling, she received an order for blood draw to be done at [facility].   Pt states that it shows repeat in 48 hours.   Pt would like to clarify what the order should be, and if it should only be one blood draw or 2.   Please advise  Trans to triage.     WEIGHT AND HEIGHT:   Wt Readings from Last 1 Encounters:   10/26/18 85.3 kg (188 lb)     Ht Readings from Last 1 Encounters:   10/26/18 5' 7\" (1.702 m)     BMI Readings from Last 1 Encounters:   10/26/18 29.44 kg/m²       ALLERGIES: not on file.  No current outpatient medications on file.     No current facility-administered medications for this visit.      PHARMACY to use:           Pharmacy preference(s) on file: No Pharmacies Listed    CALL BACK INFO: Ok to leave response (including medical information) with family member or on answering machine  ROUTING: Patient's physician/staff        PCP: Amee Graves MD         INS: Payor: [employer] / Plan: CHOICE [id number] / Product Type: POS MISC   PATIENT ADDRESS:  [address] [place] [address]     25-Dec-2022 05:47

## 2022-12-25 NOTE — ED PROVIDER NOTE - OBJECTIVE STATEMENT
Patient is a 53 yo male with PMHx Etoh abuse, DM, asthma, gastritis presenting with abdominal pain and n/v after drinking yesterday. Patient is a 51 yo male with PMHx Etoh abuse, DM, asthma, gastritis presenting with abdominal pain and n/v after drinking yesterday. Patient states he used to drink daily but didn ot have a drink until 5 days ago; patient has been drinking everyday since. Patient had several drinks last night at 9 PM and woke up PTA with multiple episodes of nonbilious nonbloody vomiting as well as nonspecific epigastric abdominal pain as well as dull CP. Denies SOB, fever, syncope. Denies any other PMHx, allergies. Denies any history of Etoh seizures but has gone into withdrawal. CIWA score 6 currently for anxiety, HA, n/v. Denies fevers, chills, dizziness, lightheadedness, dysphagia, dysarthria, diplopia, photophobia, syncope, cough, congestion, SOB, neck pain, back pain, diarrhea, dysuria, hematuria, hematochezia, hematemesis, n/v, recent travel, sick contacts, leg swelling.        : Martha

## 2022-12-25 NOTE — ED PROVIDER NOTE - ATTENDING CONTRIBUTION TO CARE
51 yo male with PMHx Etoh abuse, DM, asthma, gastritis presenting with abdominal pain and n/v after binge drinking last 5 days. Reports has been sober for a few years and relapsed. Vomiting and abd pain. Denies fever, cp, sob, back pain, diarrhea   : Shobha  AP - abd soft nontender. will check labs, treat supportively. symptoms likely 2/2 to alcohol binge. no overt signs of withdrawal. counseled on binge drinking

## 2022-12-25 NOTE — ED PROVIDER NOTE - PROGRESS NOTE DETAILS
JK - VSS, resting comfortably. Labs significant for likely alcoholic ketoacidosis; pH WNL, no signs of DKA. AG and lactic acidosis much improved s/p meds. VSS, belly soft nontender, tolerating PO. Ready and agreeable to DC with return precautions.

## 2022-12-25 NOTE — ED ADULT TRIAGE NOTE - CHIEF COMPLAINT QUOTE
patient presents from home c/o 2 days of vomiting after 5 days of drinking 1 bottle of bacardi everyday. last drink 9pm. per spouse patient hx of alcoholism, has been sober ~3 years. actively vomiting in triage.

## 2022-12-25 NOTE — ED PROVIDER NOTE - PHYSICAL EXAMINATION
Constitutional: Uncomfortable appearing, awake, alert, oriented to person, place, and time/situation and in no apparent distress  ENMT: Airway patent nasal mucosa clear. Mouth with normal mucosa. Throat has no vesicles no oropharyngeal exudates and uvula is midline. No blood in the oropharynx  EYES: clear bilaterally, pupils equal, round and reactive to light  Cardiac: Regular rate, regular rhythm. Heart sounds S1, S2. No murmurs, rubs or gallops. Good capillary refill, 2+ pulses, no peripheral edema  Respiratory: Lungs CTAB, no use of accessory muscles, no crackles, satting 95% on RA in no distress  Gastrointestinal: Abdomen nondistended, non-tender, no rebound guarding or peritoneal signs  Genitourinary: No CVA tenderness, pelvis nontender, bladder nondistended  Musculoskeletal: Spine appears normal, range of motion is not limited, no muscle or joint tenderness  Neurological: Alert and oriented, no focal deficits, no motor or sensory deficits. CN 2-12 intact, PERRLA, EOMI, No FND  Skin: Skin normal color for race, warm, dry and intact, No evidence of rash

## 2022-12-25 NOTE — ED ADULT NURSE NOTE - OBJECTIVE STATEMENT
Assumed care of pt at 0650. Pt A&Ox4 c/o vomiting, the pt's family member states that the pt has been drinking and has experienced 2 days of vomiting, the pt has been drinking for 5 days, the pt has been drinking 1 bottle of Bacardi everyday, the pt states that he had his last drink at 9pm last night, the family member states that he has a history of alcoholism and had been sober for 3 years, pt states that he is having abdominal pain from the vomiting, MD Miller bedside,  at bedside

## 2022-12-26 RX ORDER — FAMOTIDINE 10 MG/ML
20 INJECTION INTRAVENOUS DAILY
Refills: 0 | Status: DISCONTINUED | OUTPATIENT
Start: 2022-12-26 | End: 2023-01-01

## 2023-02-15 ENCOUNTER — OFFICE (OUTPATIENT)
Dept: URBAN - METROPOLITAN AREA CLINIC 116 | Facility: CLINIC | Age: 53
Setting detail: OPHTHALMOLOGY
End: 2023-02-15
Payer: COMMERCIAL

## 2023-02-15 DIAGNOSIS — E11.3293: ICD-10-CM

## 2023-02-15 DIAGNOSIS — H35.033: ICD-10-CM

## 2023-02-15 DIAGNOSIS — E11.9: ICD-10-CM

## 2023-02-15 PROBLEM — H52.4 PRESBYOPIA: Status: ACTIVE | Noted: 2023-02-15

## 2023-02-15 PROCEDURE — 92250 FUNDUS PHOTOGRAPHY W/I&R: CPT | Performed by: OPTOMETRIST

## 2023-02-15 PROCEDURE — 92004 COMPRE OPH EXAM NEW PT 1/>: CPT | Performed by: OPTOMETRIST

## 2023-02-15 ASSESSMENT — KERATOMETRY
OS_AXISANGLE_DEGREES: 075
OD_AXISANGLE_DEGREES: 168
OS_K2POWER_DIOPTERS: 47.50
OS_K1POWER_DIOPTERS: 46.50
OD_K2POWER_DIOPTERS: 46.50
OD_K1POWER_DIOPTERS: 42.00

## 2023-02-15 ASSESSMENT — REFRACTION_AUTOREFRACTION
OD_AXIS: 055
OS_SPHERE: +0.50
OD_SPHERE: +0.50
OD_CYLINDER: -0.25
OS_AXIS: 136
OS_CYLINDER: -0.50

## 2023-02-15 ASSESSMENT — CONFRONTATIONAL VISUAL FIELD TEST (CVF)
OS_FINDINGS: FULL
OD_FINDINGS: FULL

## 2023-02-15 ASSESSMENT — REFRACTION_MANIFEST
OD_SPHERE: PLANO
OD_VA1: 20/20
OD_ADD: +1.75
OS_ADD: +1.75
OS_VA1: 20/20
OS_SPHERE: PLANO

## 2023-02-15 ASSESSMENT — REFRACTION_CURRENTRX
OS_OVR_VA: 20/
OD_SPHERE: +1.25
OS_SPHERE: +1.25
OD_OVR_VA: 20/

## 2023-02-15 ASSESSMENT — VISUAL ACUITY
OD_BCVA: 20/20
OS_BCVA: 20/20

## 2023-02-15 ASSESSMENT — TONOMETRY
OD_IOP_MMHG: 12
OS_IOP_MMHG: 12

## 2023-02-15 ASSESSMENT — SPHEQUIV_DERIVED
OD_SPHEQUIV: 0.375
OS_SPHEQUIV: 0.25

## 2023-02-15 ASSESSMENT — AXIALLENGTH_DERIVED
OD_AL: 23.1777
OS_AL: 22.2855

## 2023-04-25 ENCOUNTER — APPOINTMENT (OUTPATIENT)
Dept: GASTROENTEROLOGY | Facility: CLINIC | Age: 53
End: 2023-04-25

## 2023-06-05 NOTE — ED ADULT TRIAGE NOTE - BSA (M2)
-- DO NOT REPLY / DO NOT REPLY ALL --  -- Message is from Engagement Center Operations ([facility]) --    Referral Request  Name of Specialist: Mother is requesting a dermatologist recommended by Dr. Hudson, that is in network with patient's insurance.   Provider's specialty: Dermatology    Medical condition for referral:  Psoriasis     Is this a NEW request?: yes      Referral ordered by: Dr. Stevo Pena      Insurance type:       Payor: [facility] MEDICAID / Plan: [facility] MEDICAID HMO / Product Type: T19 HMO      Preferred Delivery Method   Call when ready for pickup - phone number to notify: [phone] and Input in Epic    Caller Information       Type Contact Phone/Fax    06/05/2023 12:27 PM CDT Phone (Incoming) Elif Wakefield (Mother) [phone]          Alternative phone number: NONE     Can a detailed message be left? Yes    Please give this turnaround time to the caller:   \"This message will be sent to [state Provider's full name]. The clinical team will return your call as soon as they review your message. Typically, it takes 3 business days to process referral requests.\"   2.22

## 2023-09-13 ENCOUNTER — OFFICE (OUTPATIENT)
Dept: URBAN - METROPOLITAN AREA CLINIC 116 | Facility: CLINIC | Age: 53
Setting detail: OPHTHALMOLOGY
End: 2023-09-13
Payer: COMMERCIAL

## 2023-09-13 DIAGNOSIS — H35.033: ICD-10-CM

## 2023-09-13 DIAGNOSIS — E11.9: ICD-10-CM

## 2023-09-13 DIAGNOSIS — E11.3293: ICD-10-CM

## 2023-09-13 PROCEDURE — 99212 OFFICE O/P EST SF 10 MIN: CPT | Performed by: OPTOMETRIST

## 2023-09-13 ASSESSMENT — VISUAL ACUITY
OS_BCVA: 20/25
OD_BCVA: 20/20

## 2023-09-13 ASSESSMENT — TONOMETRY
OD_IOP_MMHG: 15
OS_IOP_MMHG: 16

## 2023-09-13 ASSESSMENT — REFRACTION_AUTOREFRACTION
OS_AXIS: 110
OD_CYLINDER: -0.50
OD_SPHERE: +0.50
OS_SPHERE: +0.75
OD_AXIS: 075
OS_CYLINDER: -0.50

## 2023-09-13 ASSESSMENT — REFRACTION_MANIFEST
OS_ADD: +1.75
OS_SPHERE: PLANO
OD_VA1: 20/20
OD_SPHERE: PLANO
OD_ADD: +2.00
OD_ADD: +1.75
OS_ADD: +2.00
OS_VA1: 20/20
OS_SPHERE: PLANO
OD_SPHERE: PLANO
OD_VA1: 20/25
OS_VA1: 20/20

## 2023-09-13 ASSESSMENT — KERATOMETRY
OD_K1POWER_DIOPTERS: 46.25
OD_AXISANGLE_DEGREES: 105
OS_K1POWER_DIOPTERS: UTP
OD_K2POWER_DIOPTERS: 46.75

## 2023-09-13 ASSESSMENT — AXIALLENGTH_DERIVED: OD_AL: 22.4506

## 2023-09-13 ASSESSMENT — CONFRONTATIONAL VISUAL FIELD TEST (CVF)
OD_FINDINGS: FULL
OS_FINDINGS: FULL

## 2023-09-13 ASSESSMENT — SPHEQUIV_DERIVED
OS_SPHEQUIV: 0.5
OD_SPHEQUIV: 0.25

## 2023-09-13 ASSESSMENT — REFRACTION_CURRENTRX
OS_OVR_VA: 20/
OS_SPHERE: +1.25
OD_OVR_VA: 20/
OD_SPHERE: +1.25

## 2023-09-25 NOTE — ED ADULT NURSE NOTE - CADM POA CENTRAL LINE
David, my name is Deborah Bowman and I am calling to reschedule an appointment, but I called you guys the date of my appointment that I missed. And the young lady was supposed to reschedule my appointment, but that was never done. So if you natalyphilip can give me a call back at [phone], be greatly appreciate it. Thank you. Nawaf.    Called [phone] left a message on pt's answering machine for a call back   No

## 2024-04-24 NOTE — ED ADULT NURSE NOTE - DISCHARGE DATE/TIME
Chief Complaint   Patient presents with    Hypertension        Internal Administration   First Dose COVID-19, ALLENA BLUE border, Primary or Immunocompromised, (age 12y+), IM, 100 mcg/0.5mL  2021   Second Dose COVID-19, MODERNA BLUE border, Primary or Immunocompromised, (age 12y+), IM, 100 mcg/0.5mL   2021       Last COVID Lab POC Glucose (mg/dl)   Date Value   2023 99             Wt Readings from Last 3 Encounters:   24 117.5 kg (259 lb)   24 112 kg (247 lb)   10/24/23 115.2 kg (254 lb)     BP Readings from Last 3 Encounters:   24 126/64   10/24/23 138/73   23 120/70      Lab Results   Component Value Date    LABA1C 5.5 2023    LABA1C 5.6 2018    LABA1C 5.9 2018       HPI:  Damian KEARNEY Kandaceromero is a 70 y.o. (: 1953) here today for    Discussed with patient why the CT lung screening was wanting to be repeated. Informed it was due to the shadows seen and with him stating he has a chronic cough.     He states that the flexeril helps when he needs it.     He states he went to see sleep medicine due to his issues with his chronic fatigue and lack of energy. They did adjust his pressures on his CPAP which he does wear nightly. They did offer some medication he could use to help him fall asleep but he states his issue is mainly with staying asleep. He can fall asleep and then he usually wakes around midnight. He admits usually he wakes due to itching in his groin and he states that the only thing to help this is a massager he uses on his legs that will help stop the issue. He states that sometimes it will be his hands itching. He states that sometimes he wakes and his face and chest and arms will be red and he has to get up and go sit in the chair and it will go away. Discussed its possible the niacin could be causing this. Discussed stopping niacin and increasing the simvastatin to 40mg. Also discussed trying to take 1-2 benadryl nightly to try and help  coordination and speech normal       Allergies   Allergen Reactions    Suprax [Cefixime] Itching     Swollen hands and itching     Prior to Visit Medications    Medication Sig Taking? Authorizing Provider   simvastatin (ZOCOR) 40 MG tablet TAKE ONE (1) TABLET AT BEDTIME Yes Narciso Moe MD   diclofenac (VOLTAREN) 75 MG EC tablet TAKE ONE (1) TABLET BY MOUTH TWO (2) TIMES DAILY Yes Narciso Moe MD   omeprazole (PRILOSEC) 40 MG delayed release capsule TAKE ONE (1) CAPSULE BY MOUTH EVERY MORNING BEFORE BREAKFAST Yes Narciso Moe MD   levothyroxine (SYNTHROID) 25 MCG tablet Take 1 tablet by mouth daily Yes Narciso Moe MD   enalapril (VASOTEC) 20 MG tablet TAKE ONE (1) TABLET BY MOUTH TWO (2) TIMES DAILY Yes Narciso Moe MD   Omega-3 Fatty Acids (FISH OIL) 1000 MG capsule TAKE SIX (6) CAPSULES TWICE DAILY Yes Narciso Moe MD   D3-1000 25 MCG (1000 UT) TABS tablet TAKE TWO (2) TABLETS BY MOUTH DAILY Yes Narciso Moe MD   cyclobenzaprine (FLEXERIL) 10 MG tablet TAKE ONE (1) TABLET BY MOUTH NIGHTLY AS NEEDED FOR MUSCLE SPASMS (MUSCLE PAIN) Yes Narciso Moe MD   allopurinol (ZYLOPRIM) 300 MG tablet Take 1 tablet by mouth daily Yes ProviderKaitlyn MD   Biotin 1000 MCG TABS Take 1 tablet by mouth daily  Yes ProviderKaitlyn MD       CareTeam (Including outside providers/suppliers regularly involved in providing care):   Patient Care Team:  Narciso Moe MD as PCP - General  Narciso Moe MD as PCP - Empaneled Provider  Brandon Sims MD as Consulting Physician (Pulmonology)  Stanfield, Denver Thomas, MD (Orthopedic Surgery)  Tiffany Taylor APRN - CNP as Consulting Physician (Nurse Practitioner)  Emily Beatty APRN - CNP as Nurse Practitioner (Family Nurse Practitioner)  Jennifer Cordero RN as Nurse Navigator     Reviewed and updated this visit:  Tobacco  Allergies  Meds   25-Dec-2022 13:30

## 2024-06-24 ENCOUNTER — OFFICE (OUTPATIENT)
Dept: URBAN - METROPOLITAN AREA CLINIC 116 | Facility: CLINIC | Age: 54
Setting detail: OPHTHALMOLOGY
End: 2024-06-24
Payer: COMMERCIAL

## 2024-06-24 DIAGNOSIS — H35.033: ICD-10-CM

## 2024-06-24 PROCEDURE — 92250 FUNDUS PHOTOGRAPHY W/I&R: CPT | Performed by: OPTOMETRIST

## 2024-06-24 PROCEDURE — 92014 COMPRE OPH EXAM EST PT 1/>: CPT | Performed by: OPTOMETRIST

## 2024-06-24 ASSESSMENT — CONFRONTATIONAL VISUAL FIELD TEST (CVF)
OD_FINDINGS: FULL
OS_FINDINGS: FULL

## 2024-08-08 NOTE — ED PROVIDER NOTE - IV ALTEPLASE INCLUSION HIDDEN
[FreeTextEntry1] : COPD - Gold grade 2, minute nodule on CT, now smoke free Currently with midl exacerbation prednisone taper and abx Start Trelegy samples given, will order Anoro once samples run out Needs home neb and prn rescue Repeat low dose rad CT scan 1/25 eventual sleep study 3 months or sooner if needed
[FreeTextEntry1] : COPD - Gold grade 2, minute nodule on CT, now smoke free Currently with midl exacerbation prednisone taper and abx Start Trelegy samples given, will order Anoro once samples run out Needs home neb and prn rescue Repeat low dose rad CT scan 1/25 eventual sleep study 3 months or sooner if needed
show

## 2025-02-08 ENCOUNTER — OFFICE (OUTPATIENT)
Dept: URBAN - METROPOLITAN AREA CLINIC 116 | Facility: CLINIC | Age: 55
Setting detail: OPHTHALMOLOGY
End: 2025-02-08
Payer: COMMERCIAL

## 2025-02-08 DIAGNOSIS — H35.033: ICD-10-CM

## 2025-02-08 DIAGNOSIS — H11.153: ICD-10-CM

## 2025-02-08 DIAGNOSIS — H25.13: ICD-10-CM

## 2025-02-08 PROCEDURE — 99213 OFFICE O/P EST LOW 20 MIN: CPT | Performed by: OPTOMETRIST

## 2025-02-08 ASSESSMENT — REFRACTION_MANIFEST
OS_SPHERE: PLANO
OD_ADD: +1.75
OS_ADD: +2.00
OS_SPHERE: +0.25
OS_VA1: 20/20
OD_SPHERE: +0.25
OD_CYLINDER: SPH
OD_SPHERE: +0.25
OD_VA1: 20/20
OS_CYLINDER: SPH
OD_ADD: +2.00
OD_ADD: +1.75
OD_ADD: +1.75
OS_VA1: 20/20
OS_CYLINDER: SPH
OS_VA1: 20/20
OD_VA1: 20/25
OS_SPHERE: PLANO
OS_VA1: 20/20
OS_ADD: +1.75
OD_SPHERE: PLANO
OS_SPHERE: +0.25
OS_ADD: +1.75
OD_VA1: 20/20
OD_VA1: 20/20
OD_CYLINDER: SPH
OD_SPHERE: PLANO
OS_ADD: +1.75

## 2025-02-08 ASSESSMENT — REFRACTION_CURRENTRX
OS_CYLINDER: SPH
OS_OVR_VA: 20/
OD_CYLINDER: SPH
OD_OVR_VA: 20/
OD_SPHERE: +1.25
OS_SPHERE: +1.75
OD_SPHERE: +1.75
OS_SPHERE: +1.25
OD_VPRISM_DIRECTION: SV
OD_OVR_VA: 20/
OS_OVR_VA: 20/
OS_VPRISM_DIRECTION: SV

## 2025-02-08 ASSESSMENT — REFRACTION_AUTOREFRACTION
OD_CYLINDER: -0.75
OS_SPHERE: +1.50
OD_AXIS: 075
OS_CYLINDER: -1.00
OS_AXIS: 100
OD_SPHERE: +1.50

## 2025-02-08 ASSESSMENT — CONFRONTATIONAL VISUAL FIELD TEST (CVF)
OS_FINDINGS: FULL
OD_FINDINGS: FULL

## 2025-02-08 ASSESSMENT — KERATOMETRY
OD_K2POWER_DIOPTERS: 46.75
OS_AXISANGLE_DEGREES: 055
OS_K2POWER_DIOPTERS: 47.00
OS_K1POWER_DIOPTERS: 46.25
OD_AXISANGLE_DEGREES: 105
OD_K1POWER_DIOPTERS: 46.00

## 2025-02-08 ASSESSMENT — VISUAL ACUITY
OS_BCVA: 20/25+
OD_BCVA: 20/25+

## 2025-04-28 NOTE — ED STATDOCS - LANGUAGE ASSISTANCE NEEDED
Yes-Patient/Caregiver accepts free interpretation services... This was a shared visit with the JACLYN. I reviewed and verified the documentation.
